# Patient Record
Sex: MALE | Race: WHITE | NOT HISPANIC OR LATINO | URBAN - METROPOLITAN AREA
[De-identification: names, ages, dates, MRNs, and addresses within clinical notes are randomized per-mention and may not be internally consistent; named-entity substitution may affect disease eponyms.]

---

## 2018-09-08 ENCOUNTER — EMERGENCY (EMERGENCY)
Facility: HOSPITAL | Age: 73
LOS: 1 days | Discharge: HOME | End: 2018-09-08
Attending: EMERGENCY MEDICINE | Admitting: EMERGENCY MEDICINE
Payer: MEDICARE

## 2018-09-08 VITALS
SYSTOLIC BLOOD PRESSURE: 144 MMHG | TEMPERATURE: 99 F | DIASTOLIC BLOOD PRESSURE: 81 MMHG | RESPIRATION RATE: 18 BRPM | OXYGEN SATURATION: 95 % | HEART RATE: 64 BPM

## 2018-09-08 VITALS
DIASTOLIC BLOOD PRESSURE: 77 MMHG | OXYGEN SATURATION: 96 % | RESPIRATION RATE: 16 BRPM | WEIGHT: 199.96 LBS | HEART RATE: 89 BPM | TEMPERATURE: 98 F | HEIGHT: 67 IN | SYSTOLIC BLOOD PRESSURE: 139 MMHG

## 2018-09-08 PROCEDURE — 99284 EMERGENCY DEPT VISIT MOD MDM: CPT

## 2018-09-08 PROCEDURE — 70450 CT HEAD/BRAIN W/O DYE: CPT

## 2018-09-08 PROCEDURE — 72125 CT NECK SPINE W/O DYE: CPT

## 2018-09-08 PROCEDURE — 70450 CT HEAD/BRAIN W/O DYE: CPT | Mod: 26

## 2018-09-08 PROCEDURE — 99284 EMERGENCY DEPT VISIT MOD MDM: CPT | Mod: 25

## 2018-09-08 PROCEDURE — 72125 CT NECK SPINE W/O DYE: CPT | Mod: 26

## 2018-09-08 RX ORDER — FAMOTIDINE 10 MG/ML
20 INJECTION INTRAVENOUS ONCE
Qty: 0 | Refills: 0 | Status: COMPLETED | OUTPATIENT
Start: 2018-09-08 | End: 2018-09-08

## 2018-09-08 RX ADMIN — FAMOTIDINE 20 MILLIGRAM(S): 10 INJECTION INTRAVENOUS at 13:50

## 2018-09-08 NOTE — ED PROVIDER NOTE - OBJECTIVE STATEMENT
73 M w HTN, HLD, presents w complaint of left foot weakness for the past 2 weeks which he believes caused 2 separate falls. One fall was in the street and another in the house. No significant trauma with the initial fall but the fall that occurred 2 days ago in the house he remembers striking his head on the night stand. He recalls having chronic left sided sciatica up until 4 years ago without recurrence. He never had an MRI for investigation and denies pain at this time. Denies headache, nausea, vomiting, dizziness.

## 2018-09-08 NOTE — ED ADULT NURSE NOTE - DISCHARGE TEACHING
follow up with PMD and with spinal specialist (number provided) for possible MRI, wear ankle foot orthotic device on L. foot, get pepcid 20mg over the counter, return for new or worsening s/s

## 2018-09-08 NOTE — ED ADULT NURSE NOTE - NSIMPLEMENTINTERV_GEN_ALL_ED
Implemented All Fall Risk Interventions:  [place] to call system. Call bell, personal items and telephone within reach. Instruct patient to call for assistance. Room bathroom lighting operational. Non-slip footwear when patient is off stretcher. Physically safe environment: no spills, clutter or unnecessary equipment. Stretcher in lowest position, wheels locked, appropriate side rails in place. Provide visual cue, wrist band, yellow gown, etc. Monitor gait and stability. Monitor for mental status changes and reorient to person, place, and time. Review medications for side effects contributing to fall risk. Reinforce activity limits and safety measures with patient and family.

## 2018-09-08 NOTE — ED PROVIDER NOTE - PROGRESS NOTE DETAILS
spoke to family and patient about results and foot drop. advised will need f/u with neuro and possible orthotic.  will f/u outpatient. will d/c home.

## 2018-09-08 NOTE — ED PROVIDER NOTE - CARE PLAN
Principal Discharge DX:	Foot drop, left  Assessment and plan of treatment:	Follow up with your Primary Care Physician within the next 2-3 days  Follow up with Spine Specialist [phone] within the next 3 days for possible MRI of Lumbosacral spine  Wear Ankle Foot Orthotic device to the left foot  Bring a copy of your test results with you to your appointment  Continue your current medication regimen  Return to the Emergency Room if you experience new or worsening symptoms

## 2018-09-08 NOTE — ED ADULT NURSE NOTE - CHIEF COMPLAINT QUOTE
multiple falls 'my feet is giving out" hit head x 2 on asa   increased sleeping after taking melatonin and xanax

## 2018-09-08 NOTE — ED ADULT TRIAGE NOTE - CHIEF COMPLAINT QUOTE
multiple falls 'my feet is giving out" denies head injury   increased sleeping after taking melatonin and xanax multiple falls 'my feet is giving out" hit head x 2 on asa   increased sleeping after taking melatonin and xanax

## 2018-09-08 NOTE — ED ADULT NURSE NOTE - OBJECTIVE STATEMENT
73y Male PMH htn hld on baby aspirin presents to the ED c/o falls. Pt states that he has hx of Sciatica but has not had any symptoms in 4-5 years and that when he did have symptoms it would be lower back pain with shooting pain down the L. leg. Pt currently reporting recent falls due to limited movement to the L. foot. Pt states he fell two days ago while trying to get out of bed and reports hitting his head on the night-stand, denies LOC. Pt also reports not being able to sleep well lately and has been taking Xanax and melatonin with minimal help. Pt presents a&oX4, ambulatory, well in appearance, airway intact, breathing spontaneously and unlabored, gross neuro intact, no slurred speech or facial droop noted, PERRL, equal sensation to bilateral extremities, decreased strength noted to L. foot, pt unable to dorsiflex L. foot, small cut noted to lateral side of L. foot- no active bleeding noted at this time (pt states it is from fall), pedal pulses presents and equal, cap refill <2 seconds, denies ha, dizziness, CP, SOB, pain, n/v, chills/fever, diaphoresis. MD at bedside for eval. safety maintained.

## 2018-09-08 NOTE — ED PROVIDER NOTE - PLAN OF CARE
Follow up with your Primary Care Physician within the next 2-3 days  Follow up with Spine Specialist [phone] within the next 3 days for possible MRI of Lumbosacral spine  Wear Ankle Foot Orthotic device to the left foot  Bring a copy of your test results with you to your appointment  Continue your current medication regimen  Return to the Emergency Room if you experience new or worsening symptoms

## 2018-09-10 PROBLEM — E78.5 HYPERLIPIDEMIA, UNSPECIFIED: Chronic | Status: ACTIVE | Noted: 2018-09-08

## 2018-09-10 PROBLEM — I10 ESSENTIAL (PRIMARY) HYPERTENSION: Chronic | Status: ACTIVE | Noted: 2018-09-08

## 2018-09-11 ENCOUNTER — APPOINTMENT (OUTPATIENT)
Dept: ORTHOPEDIC SURGERY | Facility: CLINIC | Age: 73
End: 2018-09-11
Payer: MEDICARE

## 2018-09-11 VITALS
WEIGHT: 200 LBS | HEIGHT: 67 IN | HEART RATE: 76 BPM | SYSTOLIC BLOOD PRESSURE: 113 MMHG | DIASTOLIC BLOOD PRESSURE: 70 MMHG | BODY MASS INDEX: 31.39 KG/M2

## 2018-09-11 DIAGNOSIS — Z86.39 PERSONAL HISTORY OF OTHER ENDOCRINE, NUTRITIONAL AND METABOLIC DISEASE: ICD-10-CM

## 2018-09-11 DIAGNOSIS — M51.26 OTHER INTERVERTEBRAL DISC DISPLACEMENT, LUMBAR REGION: ICD-10-CM

## 2018-09-11 DIAGNOSIS — R29.898 OTHER SYMPTOMS AND SIGNS INVOLVING THE MUSCULOSKELETAL SYSTEM: ICD-10-CM

## 2018-09-11 PROCEDURE — 99204 OFFICE O/P NEW MOD 45 MIN: CPT

## 2018-09-11 PROCEDURE — 72100 X-RAY EXAM L-S SPINE 2/3 VWS: CPT

## 2018-09-12 ENCOUNTER — INPATIENT (INPATIENT)
Facility: HOSPITAL | Age: 73
LOS: 5 days | Discharge: HOME | DRG: 64 | End: 2018-09-18
Attending: PSYCHIATRY & NEUROLOGY | Admitting: STUDENT IN AN ORGANIZED HEALTH CARE EDUCATION/TRAINING PROGRAM
Payer: MEDICARE

## 2018-09-12 VITALS
DIASTOLIC BLOOD PRESSURE: 73 MMHG | TEMPERATURE: 99 F | HEART RATE: 81 BPM | RESPIRATION RATE: 16 BRPM | OXYGEN SATURATION: 96 % | SYSTOLIC BLOOD PRESSURE: 168 MMHG | WEIGHT: 199.96 LBS

## 2018-09-12 DIAGNOSIS — R09.89 OTHER SPECIFIED SYMPTOMS AND SIGNS INVOLVING THE CIRCULATORY AND RESPIRATORY SYSTEMS: ICD-10-CM

## 2018-09-12 DIAGNOSIS — M21.372 FOOT DROP, LEFT FOOT: ICD-10-CM

## 2018-09-12 LAB
ALBUMIN SERPL ELPH-MCNC: 4.4 G/DL — SIGNIFICANT CHANGE UP (ref 3.3–5)
ALP SERPL-CCNC: 81 U/L — SIGNIFICANT CHANGE UP (ref 40–120)
ALT FLD-CCNC: 21 U/L — SIGNIFICANT CHANGE UP (ref 10–45)
ANION GAP SERPL CALC-SCNC: 12 MMOL/L — SIGNIFICANT CHANGE UP (ref 5–17)
APTT BLD: 27.3 SEC — LOW (ref 27.5–37.4)
AST SERPL-CCNC: 21 U/L — SIGNIFICANT CHANGE UP (ref 10–40)
BASOPHILS # BLD AUTO: 0 K/UL — SIGNIFICANT CHANGE UP (ref 0–0.2)
BASOPHILS NFR BLD AUTO: 0.3 % — SIGNIFICANT CHANGE UP (ref 0–2)
BILIRUB SERPL-MCNC: 0.5 MG/DL — SIGNIFICANT CHANGE UP (ref 0.2–1.2)
BUN SERPL-MCNC: 15 MG/DL — SIGNIFICANT CHANGE UP (ref 7–23)
CALCIUM SERPL-MCNC: 9.4 MG/DL — SIGNIFICANT CHANGE UP (ref 8.4–10.5)
CHLORIDE SERPL-SCNC: 100 MMOL/L — SIGNIFICANT CHANGE UP (ref 96–108)
CO2 SERPL-SCNC: 26 MMOL/L — SIGNIFICANT CHANGE UP (ref 22–31)
CREAT SERPL-MCNC: 1.26 MG/DL — SIGNIFICANT CHANGE UP (ref 0.5–1.3)
EOSINOPHIL # BLD AUTO: 0.1 K/UL — SIGNIFICANT CHANGE UP (ref 0–0.5)
EOSINOPHIL NFR BLD AUTO: 0.9 % — SIGNIFICANT CHANGE UP (ref 0–6)
GLUCOSE SERPL-MCNC: 143 MG/DL — HIGH (ref 70–99)
HCT VFR BLD CALC: 43.2 % — SIGNIFICANT CHANGE UP (ref 39–50)
HGB BLD-MCNC: 14.7 G/DL — SIGNIFICANT CHANGE UP (ref 13–17)
INR BLD: 0.95 RATIO — SIGNIFICANT CHANGE UP (ref 0.88–1.16)
LYMPHOCYTES # BLD AUTO: 1.4 K/UL — SIGNIFICANT CHANGE UP (ref 1–3.3)
LYMPHOCYTES # BLD AUTO: 24 % — SIGNIFICANT CHANGE UP (ref 13–44)
MCHC RBC-ENTMCNC: 31.9 PG — SIGNIFICANT CHANGE UP (ref 27–34)
MCHC RBC-ENTMCNC: 34.2 GM/DL — SIGNIFICANT CHANGE UP (ref 32–36)
MCV RBC AUTO: 93.5 FL — SIGNIFICANT CHANGE UP (ref 80–100)
MONOCYTES # BLD AUTO: 0.6 K/UL — SIGNIFICANT CHANGE UP (ref 0–0.9)
MONOCYTES NFR BLD AUTO: 9.4 % — SIGNIFICANT CHANGE UP (ref 2–14)
NEUTROPHILS # BLD AUTO: 3.9 K/UL — SIGNIFICANT CHANGE UP (ref 1.8–7.4)
NEUTROPHILS NFR BLD AUTO: 65.4 % — SIGNIFICANT CHANGE UP (ref 43–77)
PLATELET # BLD AUTO: 170 K/UL — SIGNIFICANT CHANGE UP (ref 150–400)
POTASSIUM SERPL-MCNC: 3.9 MMOL/L — SIGNIFICANT CHANGE UP (ref 3.5–5.3)
POTASSIUM SERPL-SCNC: 3.9 MMOL/L — SIGNIFICANT CHANGE UP (ref 3.5–5.3)
PROT SERPL-MCNC: 6.9 G/DL — SIGNIFICANT CHANGE UP (ref 6–8.3)
PROTHROM AB SERPL-ACNC: 10.3 SEC — SIGNIFICANT CHANGE UP (ref 9.8–12.7)
RBC # BLD: 4.62 M/UL — SIGNIFICANT CHANGE UP (ref 4.2–5.8)
RBC # FLD: 11.8 % — SIGNIFICANT CHANGE UP (ref 10.3–14.5)
SODIUM SERPL-SCNC: 138 MMOL/L — SIGNIFICANT CHANGE UP (ref 135–145)
WBC # BLD: 6 K/UL — SIGNIFICANT CHANGE UP (ref 3.8–10.5)
WBC # FLD AUTO: 6 K/UL — SIGNIFICANT CHANGE UP (ref 3.8–10.5)

## 2018-09-12 PROCEDURE — 99285 EMERGENCY DEPT VISIT HI MDM: CPT

## 2018-09-12 PROCEDURE — 99223 1ST HOSP IP/OBS HIGH 75: CPT

## 2018-09-12 RX ORDER — SODIUM CHLORIDE 9 MG/ML
3 INJECTION INTRAMUSCULAR; INTRAVENOUS; SUBCUTANEOUS ONCE
Qty: 0 | Refills: 0 | Status: COMPLETED | OUTPATIENT
Start: 2018-09-12 | End: 2018-09-12

## 2018-09-12 RX ORDER — SODIUM CHLORIDE 9 MG/ML
1000 INJECTION INTRAMUSCULAR; INTRAVENOUS; SUBCUTANEOUS
Qty: 0 | Refills: 0 | Status: DISCONTINUED | OUTPATIENT
Start: 2018-09-12 | End: 2018-09-14

## 2018-09-12 RX ADMIN — SODIUM CHLORIDE 3 MILLILITER(S): 9 INJECTION INTRAMUSCULAR; INTRAVENOUS; SUBCUTANEOUS at 16:18

## 2018-09-12 RX ADMIN — SODIUM CHLORIDE 125 MILLILITER(S): 9 INJECTION INTRAMUSCULAR; INTRAVENOUS; SUBCUTANEOUS at 17:51

## 2018-09-12 RX ADMIN — SODIUM CHLORIDE 125 MILLILITER(S): 9 INJECTION INTRAMUSCULAR; INTRAVENOUS; SUBCUTANEOUS at 16:18

## 2018-09-12 RX ADMIN — SODIUM CHLORIDE 1000 MILLILITER(S): 9 INJECTION INTRAMUSCULAR; INTRAVENOUS; SUBCUTANEOUS at 16:25

## 2018-09-12 NOTE — CONSULT NOTE ADULT - ASSESSMENT
Pt is a 72 yo M with a PMH of CAD, GERD, HLD, HTN and Left Sciatic (5 years ago) who is presenting with left foot drop for the past 2 weeks w/o progression or improvement, sent in by his orthopod for an MRI L-spine. On exam pt is with 4-/5 left dorsiflexion and 3/5 left inversion/eversion strength (otherwise 5/5) with a patchy left foot dorsum decreased sensation and hyperreflexia in L5. Straight leg test on the left elicited some shooting pain and increased numbness over the dorsum of the foot. Per exam and presentation pt has a left L5 radiculopathy, likely due to a herniated disc.    Recommendations:  - Conservative management  - EMG as outpatient, can call [phone] for [facility] appointment  - Rest of management as per ED and Spine surgery team

## 2018-09-12 NOTE — ED ADULT NURSE NOTE - OBJECTIVE STATEMENT
report received from Patti RN with no assessment and RN note, pt with recent falls seen in ED on sat with complaint of falls and LLE weakness and DCd home, pt sent by MD for assessment of foot drop

## 2018-09-12 NOTE — CONSULT NOTE ADULT - SUBJECTIVE AND OBJECTIVE BOX
HPI:  Pt is a 72 yo M with a PMH of CAD, GERD, HLD, HTN and Left Sciatic (5 years ago) who is presenting with left foot drop for the past 2 weeks, sent in by his orthopod for an MRI L-spine. Pt notes symptoms started 2 weeks ago on awakening, have not worsened or improved, cannot  his foot, this has never happened before and he notes no other symptoms or pains. Pt denies any recent fevers, chills, HA, dizziness, numbness, tingling, other weakness, vision changes, urinary or bowel changes, CP, SOB, cough, nausea, vomiting.      MEDICATIONS  (STANDING):  sodium chloride 0.9%. 1000 milliLiter(s) (125 mL/Hr) IV Continuous <Continuous>    MEDICATIONS  (PRN):    PAST MEDICAL & SURGICAL HISTORY:  GERD (gastroesophageal reflux disease)  CAD (coronary artery disease)  HLD (hyperlipidemia)  HTN (hypertension)    FAMILY HISTORY:    Allergies    No Known Allergies    Intolerances    SHx - No smoking, No ETOH, No drug abuse    Review of Systems:  CONSTITUTIONAL:   See HPI.    Vital Signs Last 24 Hrs  T(C): 37.1 (12 Sep 2018 19:18), Max: 37.1 (12 Sep 2018 19:18)  T(F): 98.7 (12 Sep 2018 19:18), Max: 98.7 (12 Sep 2018 19:18)  HR: 79 (12 Sep 2018 19:18) (79 - 81)  BP: 160/70 (12 Sep 2018 19:18) (160/70 - 168/73)  BP(mean): --  RR: 17 (12 Sep 2018 19:18) (16 - 17)  SpO2: 97% (12 Sep 2018 19:18) (96% - 97%)      General Exam:   General appearance: No acute distress    Neurological Exam:  Mental Status: Orientated to self, date and place.  Attention intact.  No dysarthria. Speech fluent.  Cranial Nerves:   PERRL, EOMI, VFF, no nystagmus.    CN V1-3 intact to light touch .  No facial asymmetry.  Hearing intact to finger rub bilaterally.  Tongue, uvula and palate midline.  Sternocleidomastoid and Trapezius intact bilaterally.    Motor:   Tone: normal.                  Strength: L-Dorsiflexion 4-/5, L-Eversion 3/5 and L-Inversion 3/5, otherwise all other muscle groups 5/5  Straight leg test on the left elicited some shooting pain and increased numbness over the dorsum of the foot.  Pronator drift: none b/l  Dysmetria: None to finger-nose-finger b/l  No truncal ataxia.    Tremor: No resting, postural or action tremor.  No myoclonus.    Sensation: decreased in a patchy area over the dorsum of the left foot.    Deep Tendon Reflexes:              Biceps          BR        Patellar        L5      Ankle         Babinski                                         R       2+               2+        2+              1+      1+             downgoing                                         L        2+               2+        2+              3+      1+             downgoing      09-12    138  |  100  |  15  ----------------------------<  143<H>  3.9   |  26  |  1.26    Ca    9.4      12 Sep 2018 16:21    TPro  6.9  /  Alb  4.4  /  TBili  0.5  /  DBili  x   /  AST  21  /  ALT  21  /  AlkPhos  81  09-12               14.7   6.0   )-----------( 170      ( 12 Sep 2018 16:21 )             43.2

## 2018-09-12 NOTE — H&P ADULT - NSHPSOCIALHISTORY_GEN_ALL_CORE
Social History:    Marital Status:  ( x  )    (   ) Single    (   )    (  )   Occupation: retired    Lives with: (  ) alone  (  ) children   ( x ) spouse   (  ) parents  (  ) other    Substance Use (street drugs): (x  ) never used  (  ) other:  Tobacco Usage:  (  x ) never smoked   (   ) former smoker   (   ) current smoker  (     ) pack year  (        ) last cigarette date  Alcohol Usage: no etoh use

## 2018-09-12 NOTE — H&P ADULT - HISTORY OF PRESENT ILLNESS
Patient is a 73 year old male w/PMH of CAD, GERD, HLD, HTN and Left Sciatic (5 years ago) who is presenting with left foot drop for the past 2 weeks,  Patient reports worsening left leg weakness over two weeks , complicated by recurrent falls including minor head trauma,  symptoms have been persistent during this time, not worsening . Patient reports no pain. No fever or chills , no other neurological deficits.  No urinary or bowel incontinence. He was seen in the emergency room several days prior to admission for fall , CT head imaging at that time showed no acute findings , patient was provided a leg brace and scheduled to follow up with orthopedics.  On day of admission, patient was evaluated by orthopedics who recommended coming to the hospital for further work up. Patient is a 73 year old male w/PMH of CAD, GERD, HLD, HTN and Left Sciatic (5 years ago) who is presenting with left foot drop for the past 2 weeks,  Patient reports worsening left leg weakness over two weeks , symptoms are localized to his  foot and toes,  during this time he reports recurrent falls including minor head trauma,  symptoms have been persistent not worsening . Patient reports no pain. No fever or chills , no other neurological deficits.  No urinary or bowel incontinence. He was seen in the emergency room several days prior to admission for fall , CT head imaging at that time showed no acute findings , patient was provided a leg brace and scheduled to follow up with orthopedics.  On day of admission, patient was evaluated by orthopedics who recommended coming to the hospital for further work up.

## 2018-09-12 NOTE — ED ADULT NURSE NOTE - NSIMPLEMENTINTERV_GEN_ALL_ED
Implemented All Fall Risk Interventions:  [place] to call system. Call bell, personal items and telephone within reach. Instruct patient to call for assistance. Room bathroom lighting operational. Non-slip footwear when patient is off stretcher. Physically safe environment: no spills, clutter or unnecessary equipment. Stretcher in lowest position, wheels locked, appropriate side rails in place. Provide visual cue, wrist band, yellow gown, etc. Monitor gait and stability. Monitor for mental status changes and reorient to person, place, and time. Review medications for side effects contributing to fall risk. Reinforce activity limits and safety measures with patient and family.

## 2018-09-12 NOTE — H&P ADULT - NSHPLABSRESULTS_GEN_ALL_CORE
Labs personally reviewed:                          14.7   6.0   )-----------( 170      ( 12 Sep 2018 16:21 )             43.2     09-12    138  |  100  |  15  ----------------------------<  143<H>  3.9   |  26  |  1.26    Ca    9.4      12 Sep 2018 16:21    TPro  6.9  /  Alb  4.4  /  TBili  0.5  /  DBili  x   /  AST  21  /  ALT  21  /  AlkPhos  81  09-12        LIVER FUNCTIONS - ( 12 Sep 2018 16:21 )  Alb: 4.4 g/dL / Pro: 6.9 g/dL / ALK PHOS: 81 U/L / ALT: 21 U/L / AST: 21 U/L / GGT: x           PT/INR - ( 12 Sep 2018 16:21 )   PT: 10.3 sec;   INR: 0.95 ratio         PTT - ( 12 Sep 2018 16:21 )  PTT:27.3 sec    CAPILLARY BLOOD GLUCOSE          Imaging:    EKG personally reviewed: Labs personally reviewed:                          14.7   6.0   )-----------( 170      ( 12 Sep 2018 16:21 )             43.2     09-12    138  |  100  |  15  ----------------------------<  143<H>  3.9   |  26  |  1.26    Ca    9.4      12 Sep 2018 16:21    TPro  6.9  /  Alb  4.4  /  TBili  0.5  /  DBili  x   /  AST  21  /  ALT  21  /  AlkPhos  81  09-12        LIVER FUNCTIONS - ( 12 Sep 2018 16:21 )  Alb: 4.4 g/dL / Pro: 6.9 g/dL / ALK PHOS: 81 U/L / ALT: 21 U/L / AST: 21 U/L / GGT: x           PT/INR - ( 12 Sep 2018 16:21 )   PT: 10.3 sec;   INR: 0.95 ratio         PTT - ( 12 Sep 2018 16:21 )  PTT:27.3 sec    CAPILLARY BLOOD GLUCOSE          Imaging:    EKG personally reviewed: NSR no acute s-t changes     CT brain: No acute intracranial hemorrhage or mass effect. No displaced calvarial   fracture.  CT cervical spine: No acute fracture or traumatic dilatation. No prevertebral soft tissue   swelling. Degenerative changes.  MRI-L spine pending

## 2018-09-12 NOTE — ED PROVIDER NOTE - PHYSICAL EXAMINATION
Gen:  alert, awake, no acute distress  HEENT:  atraumatic head, airway clear, pupils equal and round  CV:  rrr, nl S1, S2, no m/r/g  Pulm:  lungs CTA b/l  Abd: s/nt/nd, +BS  Ext:  moving all extremities, mild LLE weakness on dorsiflexion  Neuro:  grossly intact, no focal deficits  Skin:  clear, dry, intact  Psych: AOx3, normal affect, no apparent risk to self or others

## 2018-09-12 NOTE — ED PROVIDER NOTE - OBJECTIVE STATEMENT
pt seen here on Saturday for h/o LLE weakness and 2 falls, one with head trauma, pt had negative head ct and cervical spine CT for evaluation of the leg weakness, saw Dr. Lopez (ortho spine) as an outpatient today and was sent to ER for admission and work up of foot drop.  pt states that he has been unable to lift his L foot fully and has difficulty walking.

## 2018-09-12 NOTE — H&P ADULT - PROBLEM SELECTOR PLAN 1
symptoms persistent , no acute findings on CTH obtained several days pta , difficulty  ambulating , recurrent falls , will obtain MRI as per ortho recommendations  - MRI spine - ortho spine consult pending results   - Neurology consult appreciated   - PT

## 2018-09-12 NOTE — H&P ADULT - NSHPPHYSICALEXAM_GEN_ALL_CORE
Vital Signs Last 24 Hrs  T(C): 37.1 (12 Sep 2018 19:18), Max: 37.1 (12 Sep 2018 19:18)  T(F): 98.7 (12 Sep 2018 19:18), Max: 98.7 (12 Sep 2018 19:18)  HR: 74 (12 Sep 2018 22:43) (74 - 81)  BP: 154/68 (12 Sep 2018 22:43) (154/68 - 168/73)  BP(mean): --  RR: 17 (12 Sep 2018 22:43) (16 - 17)  SpO2: 99% (12 Sep 2018 22:43) (96% - 99%) Vital Signs Last 24 Hrs  T(C): 37.1 (12 Sep 2018 19:18), Max: 37.1 (12 Sep 2018 19:18)  T(F): 98.7 (12 Sep 2018 19:18), Max: 98.7 (12 Sep 2018 19:18)  HR: 74 (12 Sep 2018 22:43) (74 - 81)  BP: 154/68 (12 Sep 2018 22:43) (154/68 - 168/73)  BP(mean): --  RR: 17 (12 Sep 2018 22:43) (16 - 17)  SpO2: 99% (12 Sep 2018 22:43) (96% - 99%)    GENERAL: No acute distress, well-developed  HEAD:  Atraumatic, Normocephalic  ENT: EOMI, PERRLA, conjunctiva and sclera clear,  moist mucosa no pharyngeal erythema or exudates   NECK: supple , no JVD   CHEST/LUNG: Clear to auscultation bilaterally; No wheeze, equal breath sounds bilaterally   BACK: No spinal tenderness,  No CVA tenderness   HEART: Regular rate and rhythm; No murmurs, rubs, or gallops  ABDOMEN: Soft, Nontender, Nondistended; Bowel sounds present  EXTREMITIES:  No clubbing, cyanosis, or edema  MSK: No joint swelling or effusions, ROM intact   PSYCH: Normal behavior/affect  NEUROLOGY: AAOx3, cranial nerves intact, decreased motor strength on LLE 3/5  distally 4/5 proximal muscle groups ,  5/5 motor throughout ,  finger to nose intact negative babinski , sensorium grossly intact   SKIN: Normal color, No rashes or lesions

## 2018-09-12 NOTE — H&P ADULT - NSHPREVIEWOFSYSTEMS_GEN_ALL_CORE
CONSTITUTIONAL: No weakness, fevers or chills  EYES/ENT: No visual changes;  No dysphagia  NECK: No pain or stiffness  RESPIRATORY: No cough, wheezing, hemoptysis; No shortness of breath  CARDIOVASCULAR: No chest pain or palpitations; No lower extremity edema  EXTREMITIES: no le edema, cyanosis, clubbing  GASTROINTESTINAL: No abdominal or epigastric pain. No nausea, vomiting, or hematemesis; No diarrhea or constipation. No melena or hematochezia.  BACK: No back pain  GENITOURINARY: No dysuria, frequency or hematuria  NEUROLOGICAL: No numbness + weakness  MSK: no joint swelling or pain  SKIN: No itching, burning, rashes, or lesions   PSYCH: no agitation  All other review of systems is negative unless indicated above.

## 2018-09-12 NOTE — CONSULT NOTE ADULT - ATTENDING COMMENTS
72 year old [place] M with two week history of acute left foot drop with sensory dysfunction in the dorsum of the left foot with prior history of severe low back pain radiating to the left leg (described as sciatica) which improved uneventfully after epidural injection. Current symptoms are that of left foot drop with some back pain. History of HLD and HTN. Neurological examination reveals normal mental functions, cranial nerves and upper extremity evaluation except for mild LUE hyperreflexia. In the lower extremity, knee reflexes are 2+, ankles 1+ and there is a left Babinski sign, there is mild decrease in pin perception in the left dorsum of the foot, other sensory modalities are intact, straight leg testing is minimally positive on the left.     Diagnostic considerations include small lacunar stroke in the right subcortical area and possibility of left L5 nerve root compression.     Suggest MRI brain w/ DWI and MRI Lumbar spine. Please call for follow up if necessary.

## 2018-09-13 DIAGNOSIS — G47.00 INSOMNIA, UNSPECIFIED: ICD-10-CM

## 2018-09-13 DIAGNOSIS — I10 ESSENTIAL (PRIMARY) HYPERTENSION: ICD-10-CM

## 2018-09-13 DIAGNOSIS — M21.372 FOOT DROP, LEFT FOOT: ICD-10-CM

## 2018-09-13 DIAGNOSIS — I25.10 ATHEROSCLEROTIC HEART DISEASE OF NATIVE CORONARY ARTERY WITHOUT ANGINA PECTORIS: ICD-10-CM

## 2018-09-13 DIAGNOSIS — K21.9 GASTRO-ESOPHAGEAL REFLUX DISEASE WITHOUT ESOPHAGITIS: ICD-10-CM

## 2018-09-13 LAB
ANION GAP SERPL CALC-SCNC: 11 MMOL/L — SIGNIFICANT CHANGE UP (ref 5–17)
BLD GP AB SCN SERPL QL: NEGATIVE — SIGNIFICANT CHANGE UP
BUN SERPL-MCNC: 12 MG/DL — SIGNIFICANT CHANGE UP (ref 7–23)
CALCIUM SERPL-MCNC: 8.9 MG/DL — SIGNIFICANT CHANGE UP (ref 8.4–10.5)
CHLORIDE SERPL-SCNC: 105 MMOL/L — SIGNIFICANT CHANGE UP (ref 96–108)
CO2 SERPL-SCNC: 28 MMOL/L — SIGNIFICANT CHANGE UP (ref 22–31)
CREAT SERPL-MCNC: 1.11 MG/DL — SIGNIFICANT CHANGE UP (ref 0.5–1.3)
GLUCOSE SERPL-MCNC: 156 MG/DL — HIGH (ref 70–99)
HCT VFR BLD CALC: 38.6 % — LOW (ref 39–50)
HGB BLD-MCNC: 13.6 G/DL — SIGNIFICANT CHANGE UP (ref 13–17)
MCHC RBC-ENTMCNC: 33 PG — SIGNIFICANT CHANGE UP (ref 27–34)
MCHC RBC-ENTMCNC: 35.3 GM/DL — SIGNIFICANT CHANGE UP (ref 32–36)
MCV RBC AUTO: 93.5 FL — SIGNIFICANT CHANGE UP (ref 80–100)
PLATELET # BLD AUTO: 150 K/UL — SIGNIFICANT CHANGE UP (ref 150–400)
POTASSIUM SERPL-MCNC: 3.8 MMOL/L — SIGNIFICANT CHANGE UP (ref 3.5–5.3)
POTASSIUM SERPL-SCNC: 3.8 MMOL/L — SIGNIFICANT CHANGE UP (ref 3.5–5.3)
RBC # BLD: 4.13 M/UL — LOW (ref 4.2–5.8)
RBC # FLD: 11.4 % — SIGNIFICANT CHANGE UP (ref 10.3–14.5)
RH IG SCN BLD-IMP: POSITIVE — SIGNIFICANT CHANGE UP
SODIUM SERPL-SCNC: 144 MMOL/L — SIGNIFICANT CHANGE UP (ref 135–145)
WBC # BLD: 5.5 K/UL — SIGNIFICANT CHANGE UP (ref 3.8–10.5)
WBC # FLD AUTO: 5.5 K/UL — SIGNIFICANT CHANGE UP (ref 3.8–10.5)

## 2018-09-13 PROCEDURE — 71045 X-RAY EXAM CHEST 1 VIEW: CPT | Mod: 26

## 2018-09-13 PROCEDURE — 70551 MRI BRAIN STEM W/O DYE: CPT | Mod: 26

## 2018-09-13 PROCEDURE — 72148 MRI LUMBAR SPINE W/O DYE: CPT | Mod: 26

## 2018-09-13 PROCEDURE — 70498 CT ANGIOGRAPHY NECK: CPT | Mod: 26

## 2018-09-13 PROCEDURE — 70496 CT ANGIOGRAPHY HEAD: CPT | Mod: 26

## 2018-09-13 PROCEDURE — 99233 SBSQ HOSP IP/OBS HIGH 50: CPT

## 2018-09-13 RX ORDER — ENOXAPARIN SODIUM 100 MG/ML
40 INJECTION SUBCUTANEOUS EVERY 24 HOURS
Qty: 0 | Refills: 0 | Status: DISCONTINUED | OUTPATIENT
Start: 2018-09-13 | End: 2018-09-18

## 2018-09-13 RX ORDER — PANTOPRAZOLE SODIUM 20 MG/1
40 TABLET, DELAYED RELEASE ORAL
Qty: 0 | Refills: 0 | Status: DISCONTINUED | OUTPATIENT
Start: 2018-09-13 | End: 2018-09-18

## 2018-09-13 RX ORDER — ATORVASTATIN CALCIUM 80 MG/1
40 TABLET, FILM COATED ORAL AT BEDTIME
Qty: 0 | Refills: 0 | Status: DISCONTINUED | OUTPATIENT
Start: 2018-09-13 | End: 2018-09-18

## 2018-09-13 RX ORDER — LISINOPRIL 2.5 MG/1
40 TABLET ORAL DAILY
Qty: 0 | Refills: 0 | Status: DISCONTINUED | OUTPATIENT
Start: 2018-09-13 | End: 2018-09-18

## 2018-09-13 RX ORDER — METOPROLOL TARTRATE 50 MG
50 TABLET ORAL
Qty: 0 | Refills: 0 | Status: DISCONTINUED | OUTPATIENT
Start: 2018-09-13 | End: 2018-09-16

## 2018-09-13 RX ORDER — ASPIRIN/CALCIUM CARB/MAGNESIUM 324 MG
81 TABLET ORAL DAILY
Qty: 0 | Refills: 0 | Status: DISCONTINUED | OUTPATIENT
Start: 2018-09-13 | End: 2018-09-18

## 2018-09-13 RX ORDER — DIPHENHYDRAMINE HCL 50 MG
50 CAPSULE ORAL AT BEDTIME
Qty: 0 | Refills: 0 | Status: DISCONTINUED | OUTPATIENT
Start: 2018-09-13 | End: 2018-09-18

## 2018-09-13 RX ORDER — HYDROCHLOROTHIAZIDE 25 MG
12.5 TABLET ORAL DAILY
Qty: 0 | Refills: 0 | Status: DISCONTINUED | OUTPATIENT
Start: 2018-09-13 | End: 2018-09-18

## 2018-09-13 RX ORDER — CLOPIDOGREL BISULFATE 75 MG/1
75 TABLET, FILM COATED ORAL DAILY
Qty: 0 | Refills: 0 | Status: DISCONTINUED | OUTPATIENT
Start: 2018-09-13 | End: 2018-09-18

## 2018-09-13 RX ADMIN — PANTOPRAZOLE SODIUM 40 MILLIGRAM(S): 20 TABLET, DELAYED RELEASE ORAL at 07:07

## 2018-09-13 RX ADMIN — SODIUM CHLORIDE 125 MILLILITER(S): 9 INJECTION INTRAMUSCULAR; INTRAVENOUS; SUBCUTANEOUS at 22:53

## 2018-09-13 RX ADMIN — LISINOPRIL 40 MILLIGRAM(S): 2.5 TABLET ORAL at 10:32

## 2018-09-13 RX ADMIN — Medication 1 MILLIGRAM(S): at 09:06

## 2018-09-13 RX ADMIN — Medication 81 MILLIGRAM(S): at 10:42

## 2018-09-13 RX ADMIN — Medication 12.5 MILLIGRAM(S): at 10:33

## 2018-09-13 RX ADMIN — SODIUM CHLORIDE 125 MILLILITER(S): 9 INJECTION INTRAMUSCULAR; INTRAVENOUS; SUBCUTANEOUS at 00:56

## 2018-09-13 RX ADMIN — Medication 50 MILLIGRAM(S): at 10:33

## 2018-09-13 RX ADMIN — ENOXAPARIN SODIUM 40 MILLIGRAM(S): 100 INJECTION SUBCUTANEOUS at 22:52

## 2018-09-13 RX ADMIN — Medication 50 MILLIGRAM(S): at 22:52

## 2018-09-13 RX ADMIN — CLOPIDOGREL BISULFATE 75 MILLIGRAM(S): 75 TABLET, FILM COATED ORAL at 17:39

## 2018-09-13 RX ADMIN — ATORVASTATIN CALCIUM 40 MILLIGRAM(S): 80 TABLET, FILM COATED ORAL at 22:52

## 2018-09-13 RX ADMIN — Medication 50 MILLIGRAM(S): at 01:00

## 2018-09-13 NOTE — PHYSICAL THERAPY INITIAL EVALUATION ADULT - DISCHARGE DISPOSITION, PT EVAL
DC home with outpt PT services, assist from spouse as needed, pt may benefit from AFO bracing L foot, [phone] to be notified.

## 2018-09-13 NOTE — PHYSICAL THERAPY INITIAL EVALUATION ADULT - PERTINENT HX OF CURRENT PROBLEM, REHAB EVAL
Pt is a 73 year old male admitted to [facility] on 9/12/18 for L foot drop for past 2wks. Pt with worsening LLE weakness, localized to foot and toes, during this time he reports recurrent falls including minor head trauma. No urinary/bowel incontinence. He was seen in the emergency room several days prior to admission for fall

## 2018-09-13 NOTE — OCCUPATIONAL THERAPY INITIAL EVALUATION ADULT - PERTINENT HX OF CURRENT PROBLEM, REHAB EVAL
72 yo M p/w L foot drop for the past 2 weeks, Pt reports worsening L leg weakness over two weeks , symptoms are localized to his foot and toes,  during this time he reports recurrent falls including minor head trauma,  symptoms have been persistent not worsening. Pt reports no pain.  He was seen in the emergency room several days prior to admission for fall See below

## 2018-09-13 NOTE — CONSULT NOTE ADULT - ASSESSMENT
a/p 72 yo m w/ LLE weakness x 2 weeks  Patient denies trauma  MRI L Spine  Consider Steroids  PT/OT  will follow  pt has history of sciatica  r/o hernaited disk  will discusss w/ Dr. Lopez

## 2018-09-13 NOTE — CHART NOTE - NSCHARTNOTEFT_GEN_A_CORE
MRI findings reviewed, brain imaging remarkable for acute right cortical ischemic stroke which correlates with his findings of acute left foot drop, also has a chronic  right MCA stroke which patient was unaware of.     Impression: right JOSE LUIS territory ischemic stroke, etiology likely embolic from unknown source - cardiac?     Recommendations:   - c/w ASA 81 mg qd, c/w Atorvastatin   - will need vessel imaging such as CTA head/neck  - MRI findings reviewed, brain imaging remarkable for acute right cortical ischemic stroke which correlates with his findings of acute left foot drop, also has a chronic  right MCA stroke which patient was unaware of.     Impression: right [place] territory ischemic stroke, etiology likely embolic from unknown source - cardiac?     Recommendations:   - c/w ASA 81 mg qd, c/w Atorvastatin   - will need vessel imaging such as CTA head/neck  - HgA1c, lipid panel   - may benefit from cardioembolic w/u   - neuro checks q4h   - d/w stroke team who will evaluate patient for further care

## 2018-09-13 NOTE — OCCUPATIONAL THERAPY INITIAL EVALUATION ADULT - LIVES WITH, PROFILE
Pt lives in 2 family home with wife, 18 steps to enter, tub in bathroom. Pt I in ADLs and ambulation prior to admission/spouse

## 2018-09-13 NOTE — OCCUPATIONAL THERAPY INITIAL EVALUATION ADULT - ADDITIONAL COMMENTS
CT head imaging at that time showed no acute findings , pt was provided a leg brace and scheduled to follow up with orthopedics.  On day of admission, patient was evaluated by orthopedics who recommended coming to the hospital for further work up  CT Head: Mild degenerative disc disease at all lumbar levels. Moderate annular bulge L3-4 with ligamentous and facet hypertrophic changes and moderate spinal stenosis.  Small left-sided disc herniation L4-5 with left lateral recess stenosis due to the disc herniations and ligamentous hypertrophy as well as moderate spinal stenosis.  MRI Head:  Tiny area of acute infarction involving the right posterior precentral gyrus with associated cytotoxic edema. No hemorrhagic transformation. Additional small chronic high right-sided MCA distribution infarcts, as discussed. Multiple nonspecific abnormal white matter foci of T2/FLAIR prolongation  statistically favoring microvascular disease.

## 2018-09-13 NOTE — PROGRESS NOTE ADULT - ASSESSMENT
Pt is a 74 yo M with a PMH of CAD, GERD, HLD, HTN and Left Sciatic (5 years ago) who is presenting with left foot drop for the past 2 weeks w/o progression or improvement, sent in by his orthopod for an MRI L-spine. MRI brain is positive for acute infarction involving the right posterior precentral gyrus.     Impression     Acute infarction causing foot weakness in left foot     Plan     no need for permissive HTN  CTA head and neck   HgA1c  Lipid profile  ASA 81mg and Plavix 75 mg   Atorvastatin 80 mg   TTE  Telemetry monitoring  LOOP

## 2018-09-13 NOTE — CONSULT NOTE ADULT - SUBJECTIVE AND OBJECTIVE BOX
Pt is a 74 yo M complains of LLE weakness over the past 2-3 weeks, Seen by Dr. Lopez as outpatient and told he should have neurologic workup and MRI to R/O HNP as cause of his symptoms vs. Something neurologic in pathology.    PMH of CAD, GERD, HLD, HTN and Left Sciatic (5 years ago) who is presenting with left foot drop for the past 2 weeks, sent in by his orthopod for an MRI L-spine. Pt notes symptoms started 2 weeks ago on awakening, have not worsened or improved, cannot  his foot, this has never happened before and he notes no other symptoms or pains. Pt denies any recent fevers, chills, HA, dizziness, numbness, tingling, other weakness, vision changes, urinary or bowel changes, CP, SOB, cough, nausea, vomiting.    PAST MEDICAL & SURGICAL HISTORY:  GERD (gastroesophageal reflux disease)  CAD (coronary artery disease)  HLD (hyperlipidemia)  HTN (hypertension)  No significant past surgical history      FAMILY HISTORY:  No pertinent family history in first degree relatives      SOCIAL HISTORY:     Vital Signs Last 24 Hrs  T(C): 36.6 (13 Sep 2018 03:43), Max: 37.1 (12 Sep 2018 19:18)  T(F): 97.9 (13 Sep 2018 03:43), Max: 98.7 (12 Sep 2018 19:18)  HR: 66 (13 Sep 2018 03:43) (66 - 81)  BP: 143/75 (13 Sep 2018 03:43) (143/75 - 168/73)  BP(mean): --  RR: 16 (13 Sep 2018 03:43) (16 - 17)  SpO2: 99% (13 Sep 2018 03:43) (96% - 99%)  I&O's Detail      LABS:                        14.7   6.0   )-----------( 170      ( 12 Sep 2018 16:21 )             43.2     09-12    138  |  100  |  15  ----------------------------<  143<H>  3.9   |  26  |  1.26    Ca    9.4      12 Sep 2018 16:21    TPro  6.9  /  Alb  4.4  /  TBili  0.5  /  DBili  x   /  AST  21  /  ALT  21  /  AlkPhos  81  09-12    PT/INR - ( 12 Sep 2018 16:21 )   PT: 10.3 sec;   INR: 0.95 ratio         PTT - ( 12 Sep 2018 16:21 )  PTT:27.3 sec      RADIOLOGY & ADDITIONAL STUDIES:    PHYSICAL EXAM:  General; Awake and alert, Oriented x 3  Hips/Pelvis:   ABle to SLR bilaterally. Negative log roll, heel strike. No pain on passive Int/Ext hip rotation.  Spine exam:  Neck: supple, NT, Full Painless baseline AROM  Back:   Spine:                       Motor exam:          Right Upper extremity: Delt 5/5, Biceps 5/5, Triceps 5/5, Wrist Ext 5/5, Wrist Flexion 5/5,  Strength 5/5         SILT C5-S1, No Clonus, Negative Kirk, +RP, Compartments soft           Left Upper extremity: Delt 5/5, Biceps 5/5, Triceps 5/5, Wrist Ext 5/5, Wrist Flexion 5/5,  Strength 5/5         SILT C5-S1, No Clonus, Negative Kirk, +RP, Compartments soft           Right Lower Extremity: Hip Flexion 5/5, Hip Extension 5/5, Knee Flexion 5/5, Knee Extension 5/5, Ankle Dorsiflexion 5/5,          Ankle Plantar Flexion 5/5, Extensor hallucis Longus 5/5         SILT L2-S1, No pain with SLR, Negative Clonus, Negative Babinski                  Left Lower Extremity: Hip Flexion 5/5, Hip Extension 5/5, Knee Flexion 5/5, Knee Extension 5/5, Ankle Dorsiflexion 5/5,          Ankle Plantar Flexion 5/5, Extensor hallucis Longus 5/5         SILT L2-S1, No pain with SLR, Negative Clonus, Negative Babinski                                            A/P :  - Pt is a 74 yo M complains of LLE weakness over the past 2-3 weeks, Seen by Dr. Lopez as outpatient and told he should have neurologic workup and MRI to R/O HNP as cause of his symptoms vs. Something neurologic in pathology.    PMH of CAD, GERD, HLD, HTN and Left Sciatic (5 years ago) who is presenting with left foot drop for the past 2 weeks, sent in by his orthopod for an MRI L-spine. Pt notes symptoms started 2 weeks ago on awakening, have not worsened or improved, cannot  his foot, this has never happened before and he notes no other symptoms or pains. Pt denies any recent fevers, chills, HA, dizziness, numbness, tingling, other weakness, vision changes, urinary or bowel changes, CP, SOB, cough, nausea, vomiting.  patient has history of sciatica    PAST MEDICAL & SURGICAL HISTORY:  GERD (gastroesophageal reflux disease)  CAD (coronary artery disease)  HLD (hyperlipidemia)  HTN (hypertension)  No significant past surgical history      FAMILY HISTORY:  No pertinent family history in first degree relatives      SOCIAL HISTORY:     Vital Signs Last 24 Hrs  T(C): 36.6 (13 Sep 2018 03:43), Max: 37.1 (12 Sep 2018 19:18)  T(F): 97.9 (13 Sep 2018 03:43), Max: 98.7 (12 Sep 2018 19:18)  HR: 66 (13 Sep 2018 03:43) (66 - 81)  BP: 143/75 (13 Sep 2018 03:43) (143/75 - 168/73)  BP(mean): --  RR: 16 (13 Sep 2018 03:43) (16 - 17)  SpO2: 99% (13 Sep 2018 03:43) (96% - 99%)  I&O's Detail      LABS:                        14.7   6.0   )-----------( 170      ( 12 Sep 2018 16:21 )             43.2     09-12    138  |  100  |  15  ----------------------------<  143<H>  3.9   |  26  |  1.26    Ca    9.4      12 Sep 2018 16:21    TPro  6.9  /  Alb  4.4  /  TBili  0.5  /  DBili  x   /  AST  21  /  ALT  21  /  AlkPhos  81  09-12    PT/INR - ( 12 Sep 2018 16:21 )   PT: 10.3 sec;   INR: 0.95 ratio         PTT - ( 12 Sep 2018 16:21 )  PTT:27.3 sec      RADIOLOGY & ADDITIONAL STUDIES:    PHYSICAL EXAM:  General; Awake and alert, Oriented x 3  Hips/Pelvis:   ABle to SLR bilaterally. Negative log roll, heel strike. No pain on passive Int/Ext hip rotation.  Spine exam:  Neck: supple, NT, Full Painless baseline AROM  Back:   Spine:                       Motor exam:          Right Upper extremity: Delt 5/5, Biceps 5/5, Triceps 5/5, Wrist Ext 5/5, Wrist Flexion 5/5,  Strength 5/5         SILT C5-S1, No Clonus, Negative Kirk, +RP, Compartments soft           Left Upper extremity: Delt 5/5, Biceps 5/5, Triceps 5/5, Wrist Ext 5/5, Wrist Flexion 5/5,  Strength 5/5         SILT C5-S1, No Clonus, Negative Kirk, +RP, Compartments soft           Right Lower Extremity: Hip Flexion 5/5, Hip Extension 5/5, Knee Flexion 5/5, Knee Extension 5/5, Ankle Dorsiflexion 5/5,          Ankle Plantar Flexion 5/5, Extensor hallucis Longus 5/5         SILT L2-S1, No pain with SLR, Negative Clonus, Negative Babinski                  Left Lower Extremity: Hip Flexion 5/5, Hip Extension 5/5, Knee Flexion 5/5, Knee Extension 5/5, Ankle Dorsiflexion 4/5,          Ankle Plantar Flexion 5/5, Extensor hallucis Longus 4/5         SILT L2-S1, No pain with SLR, Negative Clonus, Negative Babinski

## 2018-09-13 NOTE — ED ADULT NURSE REASSESSMENT NOTE - NS ED NURSE REASSESS COMMENT FT1
md at bedside pending spinal tap, family at bedside no complaints pt resting family at bedside no complaints

## 2018-09-13 NOTE — PROGRESS NOTE ADULT - PROBLEM SELECTOR PLAN 1
- Await MRI results - Head and Lumbar spine  - Neurology and orthopedics consults appreciated   - PT pending  - OT for foot brace for L foot drop

## 2018-09-13 NOTE — PHYSICAL THERAPY INITIAL EVALUATION ADULT - PRECAUTIONS/LIMITATIONS, REHAB EVAL
CT head imaging at that time showed no acute findings , patient was provided a leg brace and scheduled to follow up with orthopedics.  On day of admission, patient was evaluated by orthopedics who recommended coming to the hospital for further work up. Pt w/PMH of CAD, GERD, HLD, HTN and Left Sciatic (5 years ago). CTH on 9/8:No acute intracranial hemorrhage or mass effect. No displaced calvarial fracture. CT cervical spine: No acute fracture or traumatic dilatation. No prevertebral soft tissue  swelling. Degenerative changes. MR Lspine: Mild degenerative disc disease at all lumbar levels. Moderate annular bulge L3-4 with ligamentous and facet hypertrophic changes and moderate spinal stenosis. Small left-sided disc herniation L4-5 with left lateral recess stenosis due to the disc herniations and ligamentous hypertrophy as well as moderate spinal stenosis. MR head: Tiny area of acute infarction involving the right posterior precentral gyrus with associated cytotoxic edema. No hemorrhagic transformation. Additional small chronic high right-sided MCA distribution infarcts, as  discussed. Multiple nonspecific abnormal white matter foci of T2/FLAIR prolongation statistically favoring microvascular disease. CT head imaging at that time showed no acute findings , patient was provided a leg brace and scheduled to follow up with orthopedics.  On day of admission, patient was evaluated by orthopedics who recommended coming to the hospital for further work up. Pt w/PMH of CAD, GERD, HLD, HTN and Left Sciatic (5 years ago). CTH on 9/8:No acute intracranial hemorrhage or mass effect. No displaced calvarial fracture. CT cervical spine: No acute fracture or traumatic dilatation. No prevertebral soft tissue  swelling. Degenerative changes. MR Lspine: Mild degenerative disc disease at all lumbar levels. Moderate annular bulge L3-4 with ligamentous and facet hypertrophic changes and moderate spinal stenosis. Small left-sided disc herniation L4-5 with left lateral recess stenosis due to the disc herniations and ligamentous hypertrophy as well as moderate spinal stenosis. MR head: Tiny area of acute infarction involving the right posterior precentral gyrus with associated cytotoxic edema. No hemorrhagic transformation. Additional small chronic high right-sided MCA distribution infarcts, as  discussed. Multiple nonspecific abnormal white matter foci of T2/FLAIR prolongation statistically favoring microvascular disease./fall precautions

## 2018-09-13 NOTE — CONSULT NOTE ADULT - SUBJECTIVE AND OBJECTIVE BOX
CHIEF COMPLAINT:Patient is a 73y old  Male who presents with a chief complaint of left lower extremity weakness x two weeks (13 Sep 2018 14:34)      HISTORY OF PRESENT ILLNESS:HPI:  Patient is a 73 year old male w/PMH of CAD, GERD, HLD, HTN and Left Sciatic (5 years ago) who is presenting with left foot drop for the past 2 weeks,  Patient reports worsening left leg weakness over two weeks , symptoms are localized to his  foot and toes,  during this time he reports recurrent falls including minor head trauma,  symptoms have been persistent not worsening . Patient reports no pain. No fever or chills , no other neurological deficits.  No urinary or bowel incontinence. He was seen in the emergency room several days prior to admission for fall , CT head imaging at that time showed no acute findings , patient was provided a leg brace and scheduled to follow up with orthopedics.  On day of admission, patient was evaluated by orthopedics who recommended coming to the hospital for further work up. (12 Sep 2018 23:39)      PAST MEDICAL & SURGICAL HISTORY:  GERD (gastroesophageal reflux disease)  CAD (coronary artery disease)  HLD (hyperlipidemia)  HTN (hypertension)  No significant past surgical history          MEDICATIONS:  aspirin enteric coated 81 milliGRAM(s) Oral daily  clopidogrel Tablet 75 milliGRAM(s) Oral daily  enoxaparin Injectable 40 milliGRAM(s) SubCutaneous every 24 hours  hydrochlorothiazide 12.5 milliGRAM(s) Oral daily  lisinopril 40 milliGRAM(s) Oral daily  metoprolol tartrate 50 milliGRAM(s) Oral two times a day        diphenhydrAMINE   Capsule 50 milliGRAM(s) Oral at bedtime PRN    pantoprazole    Tablet 40 milliGRAM(s) Oral before breakfast    atorvastatin 40 milliGRAM(s) Oral at bedtime    sodium chloride 0.9%. 1000 milliLiter(s) IV Continuous <Continuous>      FAMILY HISTORY:  No pertinent family history in first degree relatives      Non-contributory    SOCIAL HISTORY:    [ ] Tobacco  [ ] Drugs  [ ] Alcohol    Allergies    No Known Allergies    Intolerances    	    REVIEW OF SYSTEMS:  CONSTITUTIONAL: No fever  EYES: No eye pain, visual disturbances, or discharge  ENMT:  No difficulty hearing, tinnitus  NECK: No pain or stiffness  RESPIRATORY: No cough, wheezing,  CARDIOVASCULAR: No chest pain, palpitations, passing out, dizziness, or leg swelling  GASTROINTESTINAL:  No nausea, vomiting, diarrhea or constipation. No melena.  GENITOURINARY: No dysuria, hematuria  NEUROLOGICAL: No stroke like symptoms  SKIN: No burning or lesions   LYMPH Nodes: No enlarged glands  ENDOCRINE: No heat or cold intolerance  MUSCULOSKELETAL: No joint pain or swelling  PSYCHIATRIC: No  anxiety, mood swings  HEME/LYMPH: No bleeding gums  ALLERY AND IMMUNOLOGIC: No hives or eczema	    All other ROS negative    PHYSICAL EXAM:  T(C): 36.6 (09-13-18 @ 15:58), Max: 37.1 (09-12-18 @ 19:18)  HR: 61 (09-13-18 @ 15:58) (60 - 81)  BP: 162/85 (09-13-18 @ 15:58) (108/67 - 162/85)  RR: 18 (09-13-18 @ 15:58) (16 - 19)  SpO2: 96% (09-13-18 @ 15:58) (96% - 99%)  Wt(kg): --  I&O's Summary    13 Sep 2018 07:01  -  13 Sep 2018 19:13  --------------------------------------------------------  IN: 960 mL / OUT: 0 mL / NET: 960 mL        Appearance: Normal	  HEENT:   Normal oral mucosa, EOMI	  Cardiovascular: Normal S1 S2, No JVD, No murmurs  Respiratory: Lungs clear to auscultation	  Psychiatry: Alert, Mood & affect appropriate  Gastrointestinal:  Soft, Non-tender, + BS	  Skin: No rashes, No ecchymoses, No cyanosis	  Neurologic: Non-focal  Extremities:  No clubbing, cyanosis or edema  Vascular: Peripheral pulses palpable  bilaterally  	    	  	  CARDIAC MARKERS:                                  13.6   5.5   )-----------( 150      ( 13 Sep 2018 06:48 )             38.6     09-13    144  |  105  |  12  ----------------------------<  156<H>  3.8   |  28  |  1.11    Ca    8.9      13 Sep 2018 06:48    TPro  6.9  /  Alb  4.4  /  TBili  0.5  /  DBili  x   /  AST  21  /  ALT  21  /  AlkPhos  81  09-12          EKG:  Radiology:      Assessment /Plan:         Yosef Livingston DO [facility]  [phone] CHIEF COMPLAINT:Patient is a 73y old  Male who presents with a chief complaint of left lower extremity weakness x two weeks (13 Sep 2018 14:34)      HISTORY OF PRESENT ILLNESS:HPI:  Patient is a 73 year old male w/PMH of CAD, GERD, HLD, HTN and Left Sciatic (5 years ago) who is presenting with left foot drop for the past 2 weeks,  Patient reports worsening left leg weakness over two weeks , symptoms are localized to his  foot and toes,  during this time he reports recurrent falls including minor head trauma,  symptoms have been persistent not worsening . Patient reports no pain. No fever or chills , no other neurological deficits.  No urinary or bowel incontinence. He was seen in the emergency room several days prior to admission for fall , CT head imaging at that time showed no acute findings , patient was provided a leg brace and scheduled to follow up with orthopedics.  On day of admission, patient was evaluated by orthopedics who recommended coming to the hospital for further work up. (12 Sep 2018 23:39)      PAST MEDICAL & SURGICAL HISTORY:  GERD (gastroesophageal reflux disease)  CAD (coronary artery disease)  HLD (hyperlipidemia)  HTN (hypertension)  No significant past surgical history          MEDICATIONS:  aspirin enteric coated 81 milliGRAM(s) Oral daily  clopidogrel Tablet 75 milliGRAM(s) Oral daily  enoxaparin Injectable 40 milliGRAM(s) SubCutaneous every 24 hours  hydrochlorothiazide 12.5 milliGRAM(s) Oral daily  lisinopril 40 milliGRAM(s) Oral daily  metoprolol tartrate 50 milliGRAM(s) Oral two times a day        diphenhydrAMINE   Capsule 50 milliGRAM(s) Oral at bedtime PRN    pantoprazole    Tablet 40 milliGRAM(s) Oral before breakfast    atorvastatin 40 milliGRAM(s) Oral at bedtime    sodium chloride 0.9%. 1000 milliLiter(s) IV Continuous <Continuous>      FAMILY HISTORY:  No pertinent family history in first degree relatives      Non-contributory    SOCIAL HISTORY:    not a smoker    Allergies    No Known Allergies    Intolerances    	     Review of Systems:  Review of Systems: CONSTITUTIONAL: No weakness, fevers or chills  EYES/ENT: No visual changes;  No dysphagia  NECK: No pain or stiffness  RESPIRATORY: No cough, wheezing, hemoptysis; No shortness of breath  CARDIOVASCULAR: No chest pain or palpitations; No lower extremity edema  EXTREMITIES: no le edema, cyanosis, clubbing  GASTROINTESTINAL: No abdominal or epigastric pain. No nausea, vomiting, or hematemesis; No diarrhea or constipation. No melena or hematochezia.  BACK: No back pain  GENITOURINARY: No dysuria, frequency or hematuria  NEUROLOGICAL: No numbness + weakness  MSK: no joint swelling or pain  SKIN: No itching, burning, rashes, or lesions   PSYCH: no agitation All other review of systems is negative unless indicated above.	  	    All other ROS negative    PHYSICAL EXAM:  T(C): 36.6 (09-13-18 @ 15:58), Max: 37.1 (09-12-18 @ 19:18)  HR: 61 (09-13-18 @ 15:58) (60 - 81)  BP: 162/85 (09-13-18 @ 15:58) (108/67 - 162/85)  RR: 18 (09-13-18 @ 15:58) (16 - 19)  SpO2: 96% (09-13-18 @ 15:58) (96% - 99%)  Wt(kg): --  I&O's Summary    13 Sep 2018 07:01  -  13 Sep 2018 19:13  --------------------------------------------------------  IN: 960 mL / OUT: 0 mL / NET: 960 mL        GENERAL: No acute distress, well-developed  	HEAD:  Atraumatic, Normocephalic  	ENT: EOMI, PERRLA, conjunctiva and sclera clear,  moist mucosa no pharyngeal erythema or exudates   	NECK: supple , no JVD   	CHEST/LUNG: Clear to auscultation bilaterally; No wheeze, equal breath sounds bilaterally   	BACK: No spinal tenderness,  No CVA tenderness   	HEART: Regular rate and rhythm; No murmurs, rubs, or gallops  	ABDOMEN: Soft, Nontender, Nondistended; Bowel sounds present  	EXTREMITIES:  No clubbing, cyanosis, or edema  	MSK: No joint swelling or effusions, ROM intact   PSYCH: Normal behavior/affect  Neuro: refer to neurology consult  	    	  	  CARDIAC MARKERS:                                  13.6   5.5   )-----------( 150      ( 13 Sep 2018 06:48 )             38.6     09-13    144  |  105  |  12  ----------------------------<  156<H>  3.8   |  28  |  1.11    Ca    8.9      13 Sep 2018 06:48    TPro  6.9  /  Alb  4.4  /  TBili  0.5  /  DBili  x   /  AST  21  /  ALT  21  /  AlkPhos  81  09-12          EKG: NSR  Radiology:  < from: MR Head No Cont (09.13.18 @ 11:45) >  IMPRESSION: Tiny area of acute infarction involving the right posterior   precentral gyrus with associated cytotoxic edema. No hemorrhagic   transformation.    Additional small chronic high right-sided MCA distribution infarcts, as   discussed.    Multiple nonspecific abnormal white matter foci of T2/FLAIR prolongation   statistically favoring microvascular disease.    < end of copied text >      Assessment and Plan:    Assessment:  · Assessment		  73 M w/PMH of CAD, GERD, HLD, HTN and Left Sciatic (5 years ago) p/w left foot drop 2/2 CVA     Problem/Plan - 1:  ·  Problem: acute CVA  Plan: CVA Tx per neurology service, agree with ASA, statin  Pt considering ILR, wants to discuss with his sons first prior to deciding,   Carotid dopplers     Problem/Plan -   ·  Problem: CAD (coronary artery disease).  Plan: - ASA   - statin.      Problem/Plan - 3:  ·  Problem: HTN (hypertension).  Plan: - continue metoprolol  - continue ACE I   - continue HCTZ.      Problem/Plan - 4:  ·  Problem: GERD (gastroesophageal reflux disease).  Plan: - PPI.           Yosef Livingston DO [facility]  [facility]  [phone]

## 2018-09-14 PROBLEM — K21.9 GASTRO-ESOPHAGEAL REFLUX DISEASE WITHOUT ESOPHAGITIS: Chronic | Status: ACTIVE | Noted: 2018-09-12

## 2018-09-14 PROBLEM — I25.10 ATHEROSCLEROTIC HEART DISEASE OF NATIVE CORONARY ARTERY WITHOUT ANGINA PECTORIS: Chronic | Status: ACTIVE | Noted: 2018-09-12

## 2018-09-14 LAB
CHOLEST SERPL-MCNC: 103 MG/DL — SIGNIFICANT CHANGE UP (ref 10–199)
HBA1C BLD-MCNC: 5.6 % — SIGNIFICANT CHANGE UP (ref 4–5.6)
HDLC SERPL-MCNC: 30 MG/DL — LOW
LIPID PNL WITH DIRECT LDL SERPL: 53 MG/DL — SIGNIFICANT CHANGE UP
TOTAL CHOLESTEROL/HDL RATIO MEASUREMENT: 3.4 RATIO — SIGNIFICANT CHANGE UP (ref 3.4–9.6)
TRIGL SERPL-MCNC: 100 MG/DL — SIGNIFICANT CHANGE UP (ref 10–149)

## 2018-09-14 PROCEDURE — 93306 TTE W/DOPPLER COMPLETE: CPT | Mod: 26

## 2018-09-14 PROCEDURE — 99233 SBSQ HOSP IP/OBS HIGH 50: CPT

## 2018-09-14 RX ORDER — CALCIUM CARBONATE 500(1250)
2 TABLET ORAL DAILY
Qty: 0 | Refills: 0 | Status: DISCONTINUED | OUTPATIENT
Start: 2018-09-14 | End: 2018-09-18

## 2018-09-14 RX ORDER — CEFAZOLIN SODIUM 1 G
1000 VIAL (EA) INJECTION ONCE
Qty: 0 | Refills: 0 | Status: COMPLETED | OUTPATIENT
Start: 2018-09-14 | End: 2018-09-14

## 2018-09-14 RX ORDER — PANTOPRAZOLE SODIUM 20 MG/1
20 TABLET, DELAYED RELEASE ORAL ONCE
Qty: 0 | Refills: 0 | Status: COMPLETED | OUTPATIENT
Start: 2018-09-14 | End: 2018-09-14

## 2018-09-14 RX ADMIN — Medication 50 MILLIGRAM(S): at 21:13

## 2018-09-14 RX ADMIN — Medication 50 MILLIGRAM(S): at 00:10

## 2018-09-14 RX ADMIN — Medication 50 MILLIGRAM(S): at 09:23

## 2018-09-14 RX ADMIN — ENOXAPARIN SODIUM 40 MILLIGRAM(S): 100 INJECTION SUBCUTANEOUS at 22:34

## 2018-09-14 RX ADMIN — Medication 2 TABLET(S): at 21:13

## 2018-09-14 RX ADMIN — LISINOPRIL 40 MILLIGRAM(S): 2.5 TABLET ORAL at 09:23

## 2018-09-14 RX ADMIN — Medication 81 MILLIGRAM(S): at 11:14

## 2018-09-14 RX ADMIN — CLOPIDOGREL BISULFATE 75 MILLIGRAM(S): 75 TABLET, FILM COATED ORAL at 11:14

## 2018-09-14 RX ADMIN — ATORVASTATIN CALCIUM 40 MILLIGRAM(S): 80 TABLET, FILM COATED ORAL at 21:13

## 2018-09-14 RX ADMIN — Medication 100 MILLIGRAM(S): at 15:19

## 2018-09-14 RX ADMIN — Medication 12.5 MILLIGRAM(S): at 09:23

## 2018-09-14 RX ADMIN — PANTOPRAZOLE SODIUM 40 MILLIGRAM(S): 20 TABLET, DELAYED RELEASE ORAL at 05:13

## 2018-09-14 RX ADMIN — Medication 50 MILLIGRAM(S): at 22:34

## 2018-09-14 NOTE — CONSULT NOTE ADULT - ASSESSMENT
73 M w/PMH of CAD, GERD, HLD, HTN and Left Sciatic (5 years ago) p/w left foot drop 2/2 CVA. EP consulted for ILR.     CVA  -Patient and family in agreement for ILR   -Ancef 1 gram x 1 IV 30 min prior to ILR    #[id number]    Per  neuro the patient no longer requires an ILR implant secondary to carotid artery findings on CT Scan.    #[id number] 73 M w/PMH of CAD, GERD, HLD, HTN and Left Sciatic (5 years ago) p/w left foot drop 2/2 CVA. EP consulted for ILR.     CVA  -Patient and family in agreement for ILR to further evaluate for arrythmia ie: A Fib/ Aflutter   -Ancef 1 gram x 1 IV 30 min prior to ILR    #[id number]    Per  neuro the patient no longer requires an ILR implant secondary to carotid artery findings on CT Scan.    #[id number]

## 2018-09-14 NOTE — CONSULT NOTE ADULT - SUBJECTIVE AND OBJECTIVE BOX
CHIEF COMPLAINT: "I cant move my left foot"     HISTORY OF PRESENT ILLNESS:  73 year old male w/PMH of CAD, GERD, HLD, HTN and Left Sciatic (5 years ago) who is presenting with left foot drop for the past 2 weeks,    Allergies    No Known Allergies  	    MEDICATIONS:  aspirin enteric coated 81 milliGRAM(s) Oral daily  clopidogrel Tablet 75 milliGRAM(s) Oral daily  enoxaparin Injectable 40 milliGRAM(s) SubCutaneous every 24 hours  hydrochlorothiazide 12.5 milliGRAM(s) Oral daily  lisinopril 40 milliGRAM(s) Oral daily  metoprolol tartrate 50 milliGRAM(s) Oral two times a day        diphenhydrAMINE   Capsule 50 milliGRAM(s) Oral at bedtime PRN    pantoprazole    Tablet 40 milliGRAM(s) Oral before breakfast    atorvastatin 40 milliGRAM(s) Oral at bedtime    sodium chloride 0.9%. 1000 milliLiter(s) IV Continuous <Continuous>      PAST MEDICAL & SURGICAL HISTORY:  GERD (gastroesophageal reflux disease)  CAD (coronary artery disease)  HLD (hyperlipidemia)  HTN (hypertension)  No significant past surgical history      FAMILY HISTORY:  No pertinent family history in first degree relatives      SOCIAL HISTORY:    [ ] Non-smoker  [ ] Smoker  [ ] Alcohol      REVIEW OF SYSTEMS:  See HPI. Otherwise, 10 point ROS done and otherwise negative.    PHYSICAL EXAM:  T(C): 36.7 (09-14-18 @ 15:35), Max: 37 (09-13-18 @ 23:21)  HR: 72 (09-14-18 @ 15:35) (54 - 72)  BP: 154/81 (09-14-18 @ 15:35) (115/68 - 154/81)  RR: 18 (09-14-18 @ 15:35) (18 - 18)  SpO2: 97% (09-14-18 @ 15:35) (96% - 97%)  Wt(kg): --  I&O's Summary    13 Sep 2018 07:01  -  14 Sep 2018 07:00  --------------------------------------------------------  IN: 2455 mL / OUT: 0 mL / NET: 2455 mL    14 Sep 2018 07:01  -  14 Sep 2018 16:00  --------------------------------------------------------  IN: 750 mL / OUT: 0 mL / NET: 750 mL        Appearance: Normal	  HEENT:   Normal oral mucosa, PERRL, EOMI	  Lymphatic: No lymphadenopathy  Cardiovascular: Normal S1 S2, No JVD, No murmurs, No edema  Respiratory: Lungs clear to auscultation	  Psychiatry: A & O x 3, Mood & affect appropriate  Gastrointestinal:  Soft, Non-tender, + BS	  Skin: No rashes, No ecchymoses, No cyanosis	  Neurologic: Non-focal  Extremities: Normal range of motion, No clubbing, cyanosis or edema  Vascular: Peripheral pulses palpable 2+ bilaterally        LABS:	 	    CBC Full  -  ( 13 Sep 2018 06:48 )  WBC Count : 5.5 K/uL  Hemoglobin : 13.6 g/dL  Hematocrit : 38.6 %  Platelet Count - Automated : 150 K/uL  Mean Cell Volume : 93.5 fl  Mean Cell Hemoglobin : 33.0 pg  Mean Cell Hemoglobin Concentration : 35.3 gm/dL  Auto Neutrophil # : x  Auto Lymphocyte # : x  Auto Monocyte # : x  Auto Eosinophil # : x  Auto Basophil # : x  Auto Neutrophil % : x  Auto Lymphocyte % : x  Auto Monocyte % : x  Auto Eosinophil % : x  Auto Basophil % : x    09-13    144  |  105  |  12  ----------------------------<  156<H>  3.8   |  28  |  1.11  09-12    138  |  100  |  15  ----------------------------<  143<H>  3.9   |  26  |  1.26    Ca    8.9      13 Sep 2018 06:48  Ca    9.4      12 Sep 2018 16:21    TPro  6.9  /  Alb  4.4  /  TBili  0.5  /  DBili  x   /  AST  21  /  ALT  21  /  AlkPhos  81  09-12      proBNP:   Lipid Profile:   HgA1c: Hemoglobin A1C, Whole Blood: 5.6 % (09-14 @ 07:57)    TSH:       CARDIAC MARKERS:                TELEMETRY: 	    ECG:  	  RADIOLOGY:  OTHER: 	    PREVIOUS DIAGNOSTIC TESTING:    [ ] Echocardiogram:  [ ]  Catheterization:  [ ] Stress Test:  	  	  ASSESSMENT/PLAN: CHIEF COMPLAINT: "I cant move my left foot"     HISTORY OF PRESENT ILLNESS:  73 year old male w/PMH of CAD, GERD, HLD, HTN and Left Sciatic (5 years ago) who is presenting with left foot drop for the past 2 weeks.     Allergies    No Known Allergies  	    MEDICATIONS:  aspirin enteric coated 81 milliGRAM(s) Oral daily  clopidogrel Tablet 75 milliGRAM(s) Oral daily  enoxaparin Injectable 40 milliGRAM(s) SubCutaneous every 24 hours  hydrochlorothiazide 12.5 milliGRAM(s) Oral daily  lisinopril 40 milliGRAM(s) Oral daily  metoprolol tartrate 50 milliGRAM(s) Oral two times a day      diphenhydrAMINE   Capsule 50 milliGRAM(s) Oral at bedtime PRN    pantoprazole    Tablet 40 milliGRAM(s) Oral before breakfast    atorvastatin 40 milliGRAM(s) Oral at bedtime    sodium chloride 0.9%. 1000 milliLiter(s) IV Continuous <Continuous>      PAST MEDICAL & SURGICAL HISTORY:  GERD (gastroesophageal reflux disease)  CAD (coronary artery disease)  HLD (hyperlipidemia)  HTN (hypertension)  No significant past surgical history      FAMILY HISTORY:  No pertinent family history in first degree relatives      SOCIAL HISTORY:    [x ] Non-smoker  [ ] Smoker  [x ] denies Alcohol      REVIEW OF SYSTEMS:  See HPI. Otherwise, 10 point ROS done and otherwise negative.    PHYSICAL EXAM:  T(C): 36.7 (09-14-18 @ 15:35), Max: 37 (09-13-18 @ 23:21)  HR: 72 (09-14-18 @ 15:35) (54 - 72)  BP: 154/81 (09-14-18 @ 15:35) (115/68 - 154/81)  RR: 18 (09-14-18 @ 15:35) (18 - 18)  SpO2: 97% (09-14-18 @ 15:35) (96% - 97%)    13 Sep 2018 07:01  -  14 Sep 2018 07:00  --------------------------------------------------------  IN: 2455 mL / OUT: 0 mL / NET: 2455 mL    14 Sep 2018 07:01  -  14 Sep 2018 16:00  --------------------------------------------------------  IN: 750 mL / OUT: 0 mL / NET: 750 mL      Appearance: Normal	  Cardiovascular: Normal S1 S2, regular.  No JVD, No murmurs, No edema  Respiratory: Lungs clear to auscultation	  Psychiatry: A & O x 3, Mood & affect appropriate  Gastrointestinal:  Soft, Non-tender, + BS	  Skin: No rashes, No ecchymoses, No cyanosis	  Neurologic: Non-focal  Extremities: Limited range of motion left foot.  No clubbing, cyanosis or edema  Vascular: Peripheral pulses palpable 2+ bilaterally      LABS:	 	    CBC Full  -  ( 13 Sep 2018 06:48 )  WBC Count : 5.5 K/uL  Hemoglobin : 13.6 g/dL  Hematocrit : 38.6 %  Platelet Count - Automated : 150 K/uL  Mean Cell Volume : 93.5 fl  Mean Cell Hemoglobin : 33.0 pg  Mean Cell Hemoglobin Concentration : 35.3 gm/dL  Auto Neutrophil # : x  Auto Lymphocyte # : x  Auto Monocyte # : x  Auto Eosinophil # : x  Auto Basophil # : x  Auto Neutrophil % : x  Auto Lymphocyte % : x  Auto Monocyte % : x  Auto Eosinophil % : x  Auto Basophil % : x    09-13    144  |  105  |  12  ----------------------------<  156<H>  3.8   |  28  |  1.11  09-12    138  |  100  |  15  ----------------------------<  143<H>  3.9   |  26  |  1.26    Ca    8.9      13 Sep 2018 06:48  Ca    9.4      12 Sep 2018 16:21    TPro  6.9  /  Alb  4.4  /  TBili  0.5  /  DBili  x   /  AST  21  /  ALT  21  /  AlkPhos  81  09-12      proBNP:   Lipid Profile:   HgA1c: Hemoglobin A1C, Whole Blood: 5.6 % (09-14 @ 07:57)      TELEMETRY: 	 NSR  60's   ECG: NSR

## 2018-09-14 NOTE — PROGRESS NOTE ADULT - ASSESSMENT
Patient is a 73 year old male w/PMH of CAD, GERD, HLD, HTN and Left Sciatic (5 years ago) who presented with left foot drop for the past 2 weeks.Patient reported worsening left leg weakness over two weeks , symptoms are localized to his  foot and toes,  during this time he reported recurrent falls including minor head trauma,  symptoms have been persistent not worsening . Patient reported no pain. No fever or chills , no other neurological deficits.  No urinary or bowel incontinence. He was seen in the emergency room several days prior to admission for fall , CT head imaging at that time showed no acute findings , patient was provided a leg brace and scheduled to follow up with orthopedics.  On day of admission, patient was evaluated by orthopedics who recommended coming to the hospital for further work up.  Neurology consulted for the MRI head results.    NEURO: Continue close monitoring for neurologic deterioration, permissive HTN, titrate statin to LDL goal less than 70, MRI Brain w/o, MRA Head w/o and Neck w/contrast noted above. Vascular consulted for the carotid stenosis.  Physical therapy/OT recommends home with outpatient services.    ANTITHROMBOTIC THERAPY: ASA/Plavix    PULMONARY: CXR clear (09/13), protecting airway, saturating well     CARDIOVASCULAR: check TTE, cardiac monitoring                              SBP goal: normotension    GASTROINTESTINAL:  dysphagia screen- passed, tolerating diet      Diet: Regular DASH    RENAL: BUN/Cr stable, good urine output      Na Goal: Greater than 135     Silvestre:    HEMATOLOGY: H/H w, Platelets normal      DVT ppx: Heparin s.c [] LMWH []     ID: afebrile, no leukocytosis     OTHER: Family at bedside, all questions and concerns addressed.    DISPOSITION: Home once stable and workup is complete      CORE MEASURES:        Admission NIHSS:      TPA: [] YES [X] NO      LDL/HDL: 53/30     Depression Screen: 0     Statin Therapy:     Dysphagia Screen: [] PASS [] FAIL     Smoking [] YES [] NO      Afib [] YES [] NO     Stroke Education [] YES [] NO Patient is a 73 year old male w/PMH of CAD, GERD, HLD, HTN and Left Sciatic (5 years ago) who presented with left foot drop for the past 2 weeks.Patient reported worsening left leg weakness over two weeks , symptoms are localized to his  foot and toes,  during this time he reported recurrent falls including minor head trauma,  symptoms have been persistent not worsening . Patient reported no pain. No fever or chills , no other neurological deficits.  No urinary or bowel incontinence. He was seen in the emergency room several days prior to admission for fall , CT head imaging at that time showed no acute findings , patient was provided a leg brace and scheduled to follow up with orthopedics.  On day of admission, patient was evaluated by orthopedics who recommended coming to the hospital for further work up.  Neurology consulted for the MRI head results.  Impression: Acute Infarct    NEURO: Continue close monitoring for neurologic deterioration, permissive HTN, titrate statin to LDL goal less than 70, MRI Brain w/o, MRA Head w/o and Neck w/contrast noted above. Vascular consulted for the carotid stenosis.  Physical therapy/OT recommends home with outpatient services.    ANTITHROMBOTIC THERAPY: ASA/Plavix    PULMONARY: CXR clear (09/13), protecting airway, saturating well     CARDIOVASCULAR: check TTE, cardiac monitoring                              SBP goal: normotension    GASTROINTESTINAL:  dysphagia screen- passed, tolerating diet      Diet: Regular DASH    RENAL: BUN/Cr without acute change, good urine output      Na Goal: Greater than 135     Silvestre: N    HEMATOLOGY: H/H without acute change, Platelets 150     DVT ppx: Heparin s.c [] LMWH [X]     ID: afebrile, no leukocytosis     OTHER: Family at bedside, all questions and concerns addressed. Vascular consulted for 70% narrowing of the right ICA.    DISPOSITION: Home once stable and workup is complete      CORE MEASURES:        Admission NIHSS:      TPA: [] YES [X] NO      LDL/HDL: 53/30     Depression Screen: 0     Statin Therapy:     Dysphagia Screen: [] PASS [] FAIL     Smoking [] YES [] NO      Afib [] YES [] NO     Stroke Education [] YES [] NO

## 2018-09-15 PROCEDURE — 99233 SBSQ HOSP IP/OBS HIGH 50: CPT

## 2018-09-15 PROCEDURE — 93880 EXTRACRANIAL BILAT STUDY: CPT | Mod: 26

## 2018-09-15 RX ORDER — FUROSEMIDE 40 MG
20 TABLET ORAL ONCE
Qty: 0 | Refills: 0 | Status: COMPLETED | OUTPATIENT
Start: 2018-09-16 | End: 2018-09-16

## 2018-09-15 RX ADMIN — Medication 12.5 MILLIGRAM(S): at 11:22

## 2018-09-15 RX ADMIN — Medication 1 MILLIGRAM(S): at 22:35

## 2018-09-15 RX ADMIN — PANTOPRAZOLE SODIUM 40 MILLIGRAM(S): 20 TABLET, DELAYED RELEASE ORAL at 06:14

## 2018-09-15 RX ADMIN — PANTOPRAZOLE SODIUM 20 MILLIGRAM(S): 20 TABLET, DELAYED RELEASE ORAL at 00:11

## 2018-09-15 RX ADMIN — Medication 50 MILLIGRAM(S): at 22:03

## 2018-09-15 RX ADMIN — ATORVASTATIN CALCIUM 40 MILLIGRAM(S): 80 TABLET, FILM COATED ORAL at 21:37

## 2018-09-15 RX ADMIN — LISINOPRIL 40 MILLIGRAM(S): 2.5 TABLET ORAL at 11:22

## 2018-09-15 RX ADMIN — CLOPIDOGREL BISULFATE 75 MILLIGRAM(S): 75 TABLET, FILM COATED ORAL at 11:22

## 2018-09-15 RX ADMIN — Medication 50 MILLIGRAM(S): at 11:22

## 2018-09-15 RX ADMIN — Medication 81 MILLIGRAM(S): at 11:22

## 2018-09-15 RX ADMIN — ENOXAPARIN SODIUM 40 MILLIGRAM(S): 100 INJECTION SUBCUTANEOUS at 21:38

## 2018-09-15 NOTE — CONSULT NOTE ADULT - REASON FOR ADMISSION
left lower extremity weakness x two weeks

## 2018-09-15 NOTE — PROGRESS NOTE ADULT - ASSESSMENT
Patient is a 73 year old male w/PMH of CAD, GERD, HLD, HTN and Left Sciatic (5 years ago) who presented with left foot drop for the past 2 weeks prior to admission. Patient reported worsening left leg weakness over two weeks , symptoms are localized to his  foot and toes,  during this time he reported recurrent falls including minor head trauma,  symptoms have been persistent not worsening . Patient reported no pain. No fever or chills , no other neurological deficits.  No urinary or bowel incontinence. He was seen in the emergency room several days prior to admission for fall , CT head imaging at that time showed no acute findings , patient was provided a leg brace and scheduled to follow up with orthopedics.  On day of admission, patient was evaluated by orthopedics who recommended coming to the hospital for further work up. CTA neck shows 70% narrowing of the right internal carotid artery, Brain MRI shows Right MCA ischemic infarct  Impression:  Right MCA ischemic infarct likely due to CJ stenosis.     NEURO: Continue close monitoring for neurologic deterioration, permissive HTN, titrate statin to LDL goal less than 70, MRI Brain w/o, MRA Head w/o and Neck w/contrast noted above. Plan for possible Right CEA, Dr Fong (vascular sx) eval appreciated. Physical therapy/OT recommends home with outpatient services.    ANTITHROMBOTIC THERAPY: ASA/Plavix for secondary stroke prevention in the setting of carotis stenosis    PULMONARY: CXR clear (09/13), protecting airway, saturating well     CARDIOVASCULAR: TTE shows Moderate mitral regurgitation, Severely dilated left atrium,  left ventricular remodeling, diastolic LV dysfunction, EF 70-75%, cardiac monitoring no events. Pt evaluated by Dr Navas (cardiology), Will also contact Dr Sabillon (pt's private cardiologist)                             SBP goal: normotension    GASTROINTESTINAL:  Ulcer prophylaxis, dysphagia screen- passed, tolerating diet      Diet: Regular DASH    RENAL: Good urine output      Na Goal: Greater than 135     Silvestre: N    HEMATOLOGY: No signs, symptoms of active bleeding     DVT ppx:  LMWH      ID: afebrile, no signs, symptoms of infection    OTHER: Family at bedside, all questions and concerns addressed.    DISPOSITION: Home once stable and workup is complete      CORE MEASURES:        Admission NIHSS:      TPA: NO      LDL/HDL: 53/30     Depression Screen: 0     Statin Therapy:     Dysphagia Screen: PASS     Smoking  NO      Afib NO     Stroke Education YES Patient is a 73 year old male w/PMH of CAD, GERD, HLD, HTN and Left Sciatic (5 years ago) who presented with left foot drop for the past 2 weeks prior to admission. Patient reported worsening left leg weakness over two weeks , symptoms are localized to his  foot and toes,  during this time he reported recurrent falls including minor head trauma,  symptoms have been persistent not worsening. He was seen in the emergency room several days prior to admission for fall , CT head imaging at that time showed no acute findings. On day of admission, patient was evaluated by orthopedics who recommended coming to the hospital for further work up. CTA neck shows 70% narrowing of the right internal carotid artery, Brain MRI shows Right MCA ischemic infarct  Impression:  Right MCA ischemic infarct likely due to CJ stenosis.     NEURO: Continue close monitoring for neurologic deterioration, permissive HTN, titrate statin to LDL goal less than 70, MRI Brain w/o, MRA Head w/o and Neck w/contrast noted above. Plan for possible Right CEA, Dr Fong (vascular sx) eval appreciated. Physical therapy/OT recommends home with outpatient services.    ANTITHROMBOTIC THERAPY: ASA/Plavix for secondary stroke prevention in the setting of carotis stenosis    PULMONARY: CXR clear (09/13), protecting airway, saturating well     CARDIOVASCULAR: TTE shows Moderate mitral regurgitation, Severely dilated left atrium,  left ventricular remodeling, diastolic LV dysfunction, EF 70-75%, cardiac monitoring no events. Pt evaluated by Dr Navas (cardiology), Will also contact Dr Sabillon (pt's private cardiologist)                             SBP goal: normotension    GASTROINTESTINAL:  Ulcer prophylaxis, dysphagia screen- passed, tolerating diet      Diet: Regular DASH    RENAL: Good urine output      Na Goal: Greater than 135     Silvestre: N    HEMATOLOGY: No signs, symptoms of active bleeding     DVT ppx:  LMWH      ID: afebrile, no signs, symptoms of infection    OTHER: Family at bedside, all questions, plan/goals of care and concerns addressed.    DISPOSITION: Home once stable and workup is complete      CORE MEASURES:        Admission NIHSS:      TPA: NO      LDL/HDL: 53/30     Depression Screen: 0     Statin Therapy:     Dysphagia Screen: PASS     Smoking  NO      Afib NO     Stroke Education YES

## 2018-09-15 NOTE — CONSULT NOTE ADULT - SUBJECTIVE AND OBJECTIVE BOX
VASCULAR SURGERY CONSULT NOTE  --------------------------------------------------------------------------------------------    HPI:  Patient is a 73 year old male w/PMH of CAD, GERD, HLD, HTN and Left Sciatic (5 years ago) who is presenting with left foot drop for the past 2 weeks,  Patient reports worsening left leg weakness over two weeks , symptoms are localized to his  foot and toes,  during this time he reports recurrent falls including minor head trauma,  symptoms have been persistent not worsening . Patient reports no pain. No fever or chills , no other neurological deficits.  No urinary or bowel incontinence. He was seen in the emergency room several days prior to admission for fall , CT head imaging at that time showed no acute findings, patient was provided a leg brace and scheduled to follow up with orthopedics.  On day of admission, patient was evaluated by orthopedics who recommended coming to the hospital for further work up.     ROS: 10-system review is otherwise negative except HPI above.        PAST MEDICAL & SURGICAL HISTORY:  GERD (gastroesophageal reflux disease)  CAD (coronary artery disease)  HLD (hyperlipidemia)  HTN (hypertension)  No significant past surgical history    FAMILY HISTORY:  No pertinent family history in first degree relatives  [] Family history not pertinent as reviewed with the patient and family      ALLERGIES: No Known Allergies      CURRENT MEDICATIONS  MEDICATIONS (STANDING): aspirin enteric coated 81 milliGRAM(s) Oral daily  atorvastatin 40 milliGRAM(s) Oral at bedtime  clopidogrel Tablet 75 milliGRAM(s) Oral daily  enoxaparin Injectable 40 milliGRAM(s) SubCutaneous every 24 hours  hydrochlorothiazide 12.5 milliGRAM(s) Oral daily  lisinopril 40 milliGRAM(s) Oral daily  metoprolol tartrate 50 milliGRAM(s) Oral two times a day  pantoprazole    Tablet 40 milliGRAM(s) Oral before breakfast    MEDICATIONS (PRN):calcium carbonate    500 mG (Tums) Chewable 2 Tablet(s) Chew daily PRN Heartburn  diphenhydrAMINE   Capsule 50 milliGRAM(s) Oral at bedtime PRN Insomnia    --------------------------------------------------------------------------------------------    Vitals:   T(C): 36.5 (09-15-18 @ 04:27), Max: 36.7 (09-14-18 @ 12:07)  HR: 65 (09-15-18 @ 04:27) (57 - 72)  BP: 109/65 (09-15-18 @ 04:27) (109/65 - 154/81)  RR: 18 (09-15-18 @ 04:27) (18 - 18)  SpO2: 96% (09-15-18 @ 04:27) (96% - 97%)  CAPILLARY BLOOD GLUCOSE          09-13 @ 07:01  -  09-14 @ 07:00  --------------------------------------------------------  IN:    Oral Fluid: 1080 mL    sodium chloride 0.9%: 1375 mL  Total IN: 2455 mL    OUT:  Total OUT: 0 mL    Total NET: 2455 mL      09-14 @ 07:01  -  09-15 @ 06:54  --------------------------------------------------------  IN:    IV PiggyBack: 50 mL    Oral Fluid: 700 mL  Total IN: 750 mL    OUT:  Total OUT: 0 mL    Total NET: 750 mL        Height (cm): 170.18 (09-08 @ 11:11)  Weight (kg): 90.7 (09-12 @ 14:35)  BMI (kg/m2): 31.3 (09-12 @ 14:35)  BSA (m2): 2.02 (09-12 @ 14:35)    PHYSICAL EXAM:  General: NAD, Lying in bed comfortably  Neuro: A+Ox3  HEENT: NC/AT, EOMI  Neck: Soft, supple  Cardio: RRR, nml S1/S2  Resp: Good effort, CTA b/l  GI/Abd: Soft, NT/ND, no rebound/guarding, no masses palpated  Vascular: All 4 extremities warm.  Skin: Intact, no breakdown  Musculoskeletal: All 4 extremities moving spontaneously, no limitations  --------------------------------------------------------------------------------------------    LABS                --------------------------------------------------------------------------------------------    MICROBIOLOGY      --------------------------------------------------------------------------------------------    IMAGING    < from: CT Angio Head w/ IV Cont (09.13.18 @ 22:45) >  FINDINGS:     CT BRAIN:    There is no CT evidence of acute intracranial hemorrhage, extra-axial   collection, edema, mass effect, central herniation, or hydrocephalus.     There is age-appropriate cerebral volume loss and patchy white matter   hypoattenuation which is nonspecific in etiology but likely related to   chronic microvascular ischemic disease.    The visualized paranasal sinuses are clear. The mastoid air cells and   middle ear cavities are clear.    The soft tissues of the scalp are unremarkable. The calvarium is intact.    CT ANGIOGRAPHY NECK:    Three-vessel aortic arch. Atherosclerotic calcifications of the aorta   without evidence of dissection or hemodynamically significant stenosis.   The origins of the great vessels are unremarkable.     The common carotid arteries are normal in caliber without significant   stenosis.     The carotid bifurcations are unremarkable. There is 70% narrowing of the   right internal carotid artery by NASCET criteria (series 10 image 390).   There is approximately 50-69% narrowing at the origin and proximal left   carotid artery.  Predominantly soft and calcified plaque    Co-dominant vertebral arteries. The vertebral arteries are normal in   caliber without significant stenosis.     The visualized neck and upper chest are unremarkable.    Visualized osseous structures are unremarkable.    CT ANGIOGRAPHY BRAIN:    Limited evaluation secondary to suboptimal opacification of the cerebral   arteries. There is no evidence for significant stenosis, major vessel   occlusion, or aneurysm about the [place]    There is no evidence for significant stenosis, major vessel occlusion, or   aneurysm involving the vertebrobasilar system.    No enlarged vascular lesions or clusters of abnormal vessels are notedto   suggest an arterial venous malformation.    Visualized portions of the superficial and deep venous systems are   unremarkable.      IMPRESSION:     CT brain: There is no CT evidence of acute intracranial hemorrhage,   extra-axial collection, edema, mass effect, central herniation, or   hydrocephalus.    CT angiography neck: 70% narrowing of the right internal carotid artery   by NASCET criteria. No evidence of vascular dissection. Moderate,   stenosis origin left internal carotid artery corresponding to 50-69%   narrowing.     CT angiography brain: No major vessel occlusion or proximal stenosis by   NASCET criteria. No evidence of aneurysm or other vascular malformation.    < end of copied text >

## 2018-09-15 NOTE — CONSULT NOTE ADULT - ASSESSMENT
ASSESSMENT: Patient is a 73M admitted for stroke, found to have 70% narrowing of the right ICA, vascular consulted for CEA workup.    PLAN:   - Please obtain arterial duplex bilateral carotid arteries  - Vascular surgery will continue to follow    Discussed with vascular surgery fellow    Rio Peña, PGY-2  Vascular Surgery [phone]

## 2018-09-16 PROCEDURE — 99233 SBSQ HOSP IP/OBS HIGH 50: CPT

## 2018-09-16 RX ORDER — METOPROLOL TARTRATE 50 MG
25 TABLET ORAL
Qty: 0 | Refills: 0 | Status: DISCONTINUED | OUTPATIENT
Start: 2018-09-16 | End: 2018-09-18

## 2018-09-16 RX ADMIN — Medication 25 MILLIGRAM(S): at 21:42

## 2018-09-16 RX ADMIN — Medication 20 MILLIGRAM(S): at 06:24

## 2018-09-16 RX ADMIN — CLOPIDOGREL BISULFATE 75 MILLIGRAM(S): 75 TABLET, FILM COATED ORAL at 11:24

## 2018-09-16 RX ADMIN — PANTOPRAZOLE SODIUM 40 MILLIGRAM(S): 20 TABLET, DELAYED RELEASE ORAL at 06:24

## 2018-09-16 RX ADMIN — LISINOPRIL 40 MILLIGRAM(S): 2.5 TABLET ORAL at 10:19

## 2018-09-16 RX ADMIN — Medication 81 MILLIGRAM(S): at 11:24

## 2018-09-16 RX ADMIN — Medication 50 MILLIGRAM(S): at 10:18

## 2018-09-16 RX ADMIN — ENOXAPARIN SODIUM 40 MILLIGRAM(S): 100 INJECTION SUBCUTANEOUS at 21:42

## 2018-09-16 RX ADMIN — Medication 1 MILLIGRAM(S): at 22:21

## 2018-09-16 RX ADMIN — ATORVASTATIN CALCIUM 40 MILLIGRAM(S): 80 TABLET, FILM COATED ORAL at 21:42

## 2018-09-16 RX ADMIN — Medication 12.5 MILLIGRAM(S): at 10:19

## 2018-09-16 NOTE — PROGRESS NOTE ADULT - ASSESSMENT
Patient is a 73 year old male w/PMH of CAD, GERD, HLD, HTN and Left Sciatic (5 years ago) who presented with left foot drop for the past 2 weeks prior to admission. Patient reported worsening left leg weakness over two weeks , symptoms are localized to his  foot and toes,  during this time he reported recurrent falls including minor head trauma,  symptoms have been persistent not worsening. He was seen in the emergency room several days prior to admission for fall , CT head imaging at that time showed no acute findings. On day of admission, patient was evaluated by orthopedics who recommended coming to the hospital for further work up. CTA neck shows 70% narrowing of the right internal carotid artery, Brain MRI shows Right MCA ischemic infarct  Impression:  Right MCA ischemic infarct likely due to CJ stenosis.     NEURO: Continue close monitoring for neurologic deterioration, permissive HTN, titrate statin to LDL goal less than 70, MRI Brain w/o, MRA Head w/o and Neck w/contrast noted above. Plan for possible Right CEA, Dr Fong (vascular sx) eval appreciated. Physical therapy/OT recommends home with outpatient services.    ANTITHROMBOTIC THERAPY: ASA/Plavix for secondary stroke prevention in the setting of carotis stenosis    PULMONARY: CXR clear (09/13), protecting airway, saturating well     CARDIOVASCULAR: TTE shows moderate mitral regurgitation, Severely dilated left atrium,  left ventricular remodeling, diastolic LV dysfunction, EF 70-75%, cardiac monitoring no events. Pt evaluated by Dr Navas (cardiology), plan for ALISON to r/o LA thrombus, and  LOOP to monitor for occult arrhythmias once stable after CEA                           SBP goal: normotension    GASTROINTESTINAL:  Ulcer prophylaxis, dysphagia screen- passed, tolerating diet. NPO  after midnight for possible CEA      Diet: Regular DASH    RENAL: Good urine output      Na Goal: Greater than 135     Silvestre: N    HEMATOLOGY: No signs, symptoms of active bleeding     DVT ppx:  LMWH      ID: afebrile, no signs, symptoms of infection    OTHER: Family at bedside, all questions, plan/goals of care and concerns addressed.    DISPOSITION: Home once stable and workup is complete      CORE MEASURES:        Admission NIHSS:      TPA: NO      LDL/HDL: 53/30     Depression Screen: 0     Statin Therapy:     Dysphagia Screen: PASS     Smoking  NO      Afib NO     Stroke Education YES Patient is a 73 year old male w/PMH of CAD, GERD, HLD, HTN and Left Sciatic (5 years ago) who presented with left foot drop for the past 2 weeks prior to admission. Patient reported worsening left leg weakness over two weeks , symptoms are localized to his  foot and toes,  during this time he reported recurrent falls including minor head trauma,  symptoms have been persistent not worsening. He was seen in the emergency room several days prior to admission for fall , CT head imaging at that time showed no acute findings. On day of admission, patient was evaluated by orthopedics who recommended coming to the hospital for further work up. CTA neck shows 70% narrowing of the right internal carotid artery, Brain MRI shows Right MCA ischemic infarct  Impression:  Right MCA ischemic infarct likely due to CJ stenosis.     NEURO: Continue close monitoring for neurologic deterioration, permissive HTN, titrate statin to LDL goal less than 70, MRI Brain w/o, MRA Head w/o and Neck w/contrast noted above. Plan for possible Right CEA, Dr Fong (vascular sx) eval appreciated. Physical therapy/OT recommends home with outpatient services.    ANTITHROMBOTIC THERAPY: ASA/Plavix for secondary stroke prevention in the setting of carotis stenosis    PULMONARY: CXR clear (09/13), protecting airway, saturating well     CARDIOVASCULAR: TTE shows moderate mitral regurgitation, Severely dilated left atrium,  left ventricular remodeling, diastolic LV dysfunction, EF 70-75%, cardiac monitoring no events. Pt evaluated by Dr Navas (cardiology), plan for ALISON to r/o LA thrombus, and  LOOP to monitor for occult arrhythmias once stable after CEA, will decreased lopressor to 25mg BID                           SBP goal: normotension    GASTROINTESTINAL:  Ulcer prophylaxis, dysphagia screen- passed, tolerating diet. NPO  after midnight for possible CEA      Diet: Regular DASH    RENAL: Good urine output      Na Goal: Greater than 135     Silvestre: N    HEMATOLOGY: No signs, symptoms of active bleeding     DVT ppx:  LMWH      ID: afebrile, no signs, symptoms of infection    OTHER: Family at bedside, all questions, plan/goals of care and concerns addressed.    DISPOSITION: Home once stable and workup is complete      CORE MEASURES:        Admission NIHSS:      TPA: NO      LDL/HDL: 53/30     Depression Screen: 0     Statin Therapy:     Dysphagia Screen: PASS     Smoking  NO      Afib NO     Stroke Education YES Patient is a 73 year old male w/PMH of CAD, GERD, HLD, HTN and Left Sciatic (5 years ago) who presented with left foot drop for the past 2 weeks prior to admission. Patient reported worsening left leg weakness over two weeks , symptoms are localized to his  foot and toes,  during this time he reported recurrent falls including minor head trauma,  symptoms have been persistent not worsening. He was seen in the emergency room several days prior to admission for fall , CT head imaging at that time showed no acute findings. On day of admission, patient was evaluated by orthopedics who recommended coming to the hospital for further work up. CTA neck shows 70% narrowing of the right internal carotid artery, Brain MRI shows Right MCA ischemic infarct  Impression:  Right MCA ischemic infarct likely due to large artery atherosclerosis, CJ stenosis.     NEURO: Continue close monitoring for neurologic deterioration, permissive HTN, titrate statin to LDL goal less than 70, MRI Brain w/o, MRA Head w/o and Neck w/contrast noted above. Plan for possible Right CEA, Dr Fong (vascular sx) eval appreciated. Physical therapy/OT recommends home with outpatient services.  I spoke with vascular surgery; patient will need cardiac clearance before CEA.    ANTITHROMBOTIC THERAPY: ASA/Plavix for secondary stroke prevention in the setting of carotis stenosis    PULMONARY: CXR clear (09/13), protecting airway, saturating well     CARDIOVASCULAR: TTE shows moderate mitral regurgitation, Severely dilated left atrium,  left ventricular remodeling, diastolic LV dysfunction, EF 70-75%, cardiac monitoring no events. Pt evaluated by Dr Navas (cardiology), plan for ALISON to r/o LA thrombus, and  LOOP to monitor for occult arrhythmias once stable after CEA, will decreased lopressor to 25mg BID                           SBP goal: normotension    GASTROINTESTINAL:  Ulcer prophylaxis, dysphagia screen- passed, tolerating diet. NPO  after midnight for possible CEA      Diet: Regular DASH    RENAL: Good urine output      Na Goal: Greater than 135     Silvestre: N    HEMATOLOGY: No signs, symptoms of active bleeding     DVT ppx:  LMWH      ID: afebrile, no signs, symptoms of infection    OTHER: Family at bedside, all questions, plan/goals of care and concerns addressed.    DISPOSITION: Home once stable and workup is complete      CORE MEASURES:        Admission NIHSS:      TPA: NO      LDL/HDL: 53/30     Depression Screen: 0     Statin Therapy:     Dysphagia Screen: PASS     Smoking  NO      Afib NO     Stroke Education YES

## 2018-09-16 NOTE — PROGRESS NOTE ADULT - ATTENDING COMMENTS
Mr. Tejeda is a 74 yo M with a PMH of CAD, GERD, HLD, HTN and Left Sciatic (5 years ago) who is presenting with left foot drop for the past 2 weeks w/o progression or improvement, sent in by his orthopod for an MRI L-spine. Neurological exam is significant for 3/5 dorsiflexion of left foot.  MRI brain shows right JOSE LUIS territory cortical infarction.  Impression: Acute right frontal cortical infarction, JOSE LUIS territory.  Continue stroke workup including echocardiogram, hemoglobin A1c lipid panel and loop recorder to rule out possible cardioembolic event.  MRI shows multiple small previous silent lacunar infarcts.  Aggressiveness factor control.
I also spoke with the family and patient in great detail regarding CEA. there can be multiple mechanisms of a new small ischemic infarct and therefore His cardioembolic workup will follow after CEA is completed.
Pending MRI results, PT/OT eval to determine plan and disposition.

## 2018-09-17 RX ADMIN — Medication 0.5 MILLIGRAM(S): at 22:43

## 2018-09-17 RX ADMIN — PANTOPRAZOLE SODIUM 40 MILLIGRAM(S): 20 TABLET, DELAYED RELEASE ORAL at 06:44

## 2018-09-17 RX ADMIN — Medication 12.5 MILLIGRAM(S): at 11:13

## 2018-09-17 RX ADMIN — ATORVASTATIN CALCIUM 40 MILLIGRAM(S): 80 TABLET, FILM COATED ORAL at 21:10

## 2018-09-17 RX ADMIN — CLOPIDOGREL BISULFATE 75 MILLIGRAM(S): 75 TABLET, FILM COATED ORAL at 14:31

## 2018-09-17 RX ADMIN — Medication 25 MILLIGRAM(S): at 17:06

## 2018-09-17 RX ADMIN — ENOXAPARIN SODIUM 40 MILLIGRAM(S): 100 INJECTION SUBCUTANEOUS at 21:10

## 2018-09-17 RX ADMIN — LISINOPRIL 40 MILLIGRAM(S): 2.5 TABLET ORAL at 11:13

## 2018-09-17 RX ADMIN — Medication 81 MILLIGRAM(S): at 14:30

## 2018-09-17 RX ADMIN — Medication 25 MILLIGRAM(S): at 06:44

## 2018-09-17 NOTE — PROGRESS NOTE ADULT - ASSESSMENT
Patient is a 73 year old male w/PMH of CAD, GERD, HLD, HTN and Left Sciatic (5 years ago) who presented with left foot drop for the past 2 weeks prior to admission. Patient reported worsening left leg weakness over two weeks , symptoms are localized to his  foot and toes,  during this time he reported recurrent falls including minor head trauma,  symptoms have been persistent not worsening. He was seen in the emergency room several days prior to admission for fall , CT head imaging at that time showed no acute findings. On day of admission, patient was evaluated by orthopedics who recommended coming to the hospital for further work up. CTA neck shows 70% narrowing of the right internal carotid artery, Brain MRI shows Right MCA ischemic infarct  Impression:  Right MCA ischemic infarct likely due to large artery atherosclerosis, CJ stenosis.     NEURO: Continue close monitoring for neurologic deterioration, permissive HTN, titrate statin to LDL goal less than 70, MRI Brain w/o, MRA Head w/o and Neck w/contrast noted above. Plan for possible Right CEA, Dr Fong (vascular sx) eval appreciated. Physical therapy/OT recommends home with outpatient services.    ANTITHROMBOTIC THERAPY: ASA/Plavix for secondary stroke prevention in the setting of carotis stenosis    PULMONARY: CXR clear (09/13), protecting airway, saturating well     CARDIOVASCULAR: TTE shows moderate mitral regurgitation, Severely dilated left atrium,  left ventricular remodeling, diastolic LV dysfunction, EF 70-75%, cardiac monitoring no events. Pt evaluated by Dr Navas (cardiology), plan for ALISON to r/o LA thrombus, and  LOOP to monitor for occult arrhythmias, plan for stress test on Tuesday as per patient's outpatient cardiologist Dr. Thierno Ferrer recommendations, prior to CEA with Krishnasastry.                           SBP goal: normotension    GASTROINTESTINAL:  Ulcer prophylaxis, dysphagia screen- passed, tolerating diet.     Diet: Regular DASH    RENAL: Good urine output      Na Goal: Greater than 135     Silvestre: N    HEMATOLOGY: No signs, symptoms of active bleeding     DVT ppx:  LMWH      ID: afebrile, no signs, symptoms of infection    OTHER: Family at bedside, all questions, plan/goals of care and concerns addressed.    DISPOSITION: Home once stable and workup is complete      CORE MEASURES:        Admission NIHSS:      TPA: NO      LDL/HDL: 53/30     Depression Screen: 0     Statin Therapy:     Dysphagia Screen: PASS     Smoking  NO      Afib NO     Stroke Education YES

## 2018-09-18 ENCOUNTER — TRANSCRIPTION ENCOUNTER (OUTPATIENT)
Age: 73
End: 2018-09-18

## 2018-09-18 VITALS
SYSTOLIC BLOOD PRESSURE: 113 MMHG | OXYGEN SATURATION: 94 % | RESPIRATION RATE: 18 BRPM | DIASTOLIC BLOOD PRESSURE: 70 MMHG | TEMPERATURE: 98 F | HEART RATE: 90 BPM

## 2018-09-18 LAB
ANION GAP SERPL CALC-SCNC: 10 MMOL/L — SIGNIFICANT CHANGE UP (ref 5–17)
ANION GAP SERPL CALC-SCNC: 13 MMOL/L — SIGNIFICANT CHANGE UP (ref 5–17)
BLD GP AB SCN SERPL QL: NEGATIVE — SIGNIFICANT CHANGE UP
BUN SERPL-MCNC: 27 MG/DL — HIGH (ref 7–23)
BUN SERPL-MCNC: 32 MG/DL — HIGH (ref 7–23)
CALCIUM SERPL-MCNC: 8.8 MG/DL — SIGNIFICANT CHANGE UP (ref 8.4–10.5)
CALCIUM SERPL-MCNC: 9.2 MG/DL — SIGNIFICANT CHANGE UP (ref 8.4–10.5)
CHLORIDE SERPL-SCNC: 98 MMOL/L — SIGNIFICANT CHANGE UP (ref 96–108)
CHLORIDE SERPL-SCNC: 98 MMOL/L — SIGNIFICANT CHANGE UP (ref 96–108)
CK MB CFR SERPL CALC: 2.4 NG/ML — SIGNIFICANT CHANGE UP (ref 0–6.7)
CK SERPL-CCNC: 73 U/L — SIGNIFICANT CHANGE UP (ref 30–200)
CO2 SERPL-SCNC: 26 MMOL/L — SIGNIFICANT CHANGE UP (ref 22–31)
CO2 SERPL-SCNC: 29 MMOL/L — SIGNIFICANT CHANGE UP (ref 22–31)
CREAT SERPL-MCNC: 1.39 MG/DL — HIGH (ref 0.5–1.3)
CREAT SERPL-MCNC: 1.75 MG/DL — HIGH (ref 0.5–1.3)
GLUCOSE SERPL-MCNC: 117 MG/DL — HIGH (ref 70–99)
GLUCOSE SERPL-MCNC: 183 MG/DL — HIGH (ref 70–99)
MAGNESIUM SERPL-MCNC: 1.8 MG/DL — SIGNIFICANT CHANGE UP (ref 1.6–2.6)
PHOSPHATE SERPL-MCNC: 3.4 MG/DL — SIGNIFICANT CHANGE UP (ref 2.5–4.5)
POTASSIUM SERPL-MCNC: 3.8 MMOL/L — SIGNIFICANT CHANGE UP (ref 3.5–5.3)
POTASSIUM SERPL-MCNC: 3.8 MMOL/L — SIGNIFICANT CHANGE UP (ref 3.5–5.3)
POTASSIUM SERPL-SCNC: 3.8 MMOL/L — SIGNIFICANT CHANGE UP (ref 3.5–5.3)
POTASSIUM SERPL-SCNC: 3.8 MMOL/L — SIGNIFICANT CHANGE UP (ref 3.5–5.3)
RH IG SCN BLD-IMP: POSITIVE — SIGNIFICANT CHANGE UP
SODIUM SERPL-SCNC: 137 MMOL/L — SIGNIFICANT CHANGE UP (ref 135–145)
SODIUM SERPL-SCNC: 137 MMOL/L — SIGNIFICANT CHANGE UP (ref 135–145)
TROPONIN T, HIGH SENSITIVITY RESULT: 23 NG/L — SIGNIFICANT CHANGE UP (ref 0–51)

## 2018-09-18 PROCEDURE — 93312 ECHO TRANSESOPHAGEAL: CPT | Mod: 26

## 2018-09-18 PROCEDURE — 93320 DOPPLER ECHO COMPLETE: CPT | Mod: 26

## 2018-09-18 PROCEDURE — 93325 DOPPLER ECHO COLOR FLOW MAPG: CPT | Mod: 26

## 2018-09-18 RX ORDER — METOPROLOL TARTRATE 50 MG
1 TABLET ORAL
Qty: 0 | Refills: 0 | DISCHARGE
Start: 2018-09-18

## 2018-09-18 RX ORDER — METOPROLOL TARTRATE 50 MG
0 TABLET ORAL
Qty: 0 | Refills: 0 | COMMUNITY

## 2018-09-18 RX ORDER — ATORVASTATIN CALCIUM 80 MG/1
1 TABLET, FILM COATED ORAL
Qty: 0 | Refills: 0 | DISCHARGE
Start: 2018-09-18

## 2018-09-18 RX ORDER — ATORVASTATIN CALCIUM 80 MG/1
1 TABLET, FILM COATED ORAL
Qty: 0 | Refills: 0 | COMMUNITY

## 2018-09-18 RX ORDER — PANTOPRAZOLE SODIUM 20 MG/1
1 TABLET, DELAYED RELEASE ORAL
Qty: 0 | Refills: 0 | COMMUNITY
Start: 2018-09-18

## 2018-09-18 RX ORDER — HYDROCHLOROTHIAZIDE 25 MG
1 TABLET ORAL
Qty: 0 | Refills: 0 | DISCHARGE
Start: 2018-09-18

## 2018-09-18 RX ORDER — QUETIAPINE FUMARATE 200 MG/1
25 TABLET, FILM COATED ORAL AT BEDTIME
Qty: 0 | Refills: 0 | Status: DISCONTINUED | OUTPATIENT
Start: 2018-09-18 | End: 2018-09-18

## 2018-09-18 RX ORDER — SODIUM CHLORIDE 9 MG/ML
1000 INJECTION INTRAMUSCULAR; INTRAVENOUS; SUBCUTANEOUS
Qty: 0 | Refills: 0 | Status: DISCONTINUED | OUTPATIENT
Start: 2018-09-18 | End: 2018-09-18

## 2018-09-18 RX ORDER — CLOPIDOGREL BISULFATE 75 MG/1
1 TABLET, FILM COATED ORAL
Qty: 30 | Refills: 0
Start: 2018-09-18 | End: 2018-10-17

## 2018-09-18 RX ORDER — CLOPIDOGREL BISULFATE 75 MG/1
1 TABLET, FILM COATED ORAL
Qty: 30 | Refills: 0 | OUTPATIENT
Start: 2018-09-18 | End: 2018-10-17

## 2018-09-18 RX ORDER — METOPROLOL TARTRATE 50 MG
5 TABLET ORAL ONCE
Qty: 0 | Refills: 0 | Status: COMPLETED | OUTPATIENT
Start: 2018-09-18 | End: 2018-09-18

## 2018-09-18 RX ORDER — PANTOPRAZOLE SODIUM 20 MG/1
40 TABLET, DELAYED RELEASE ORAL ONCE
Qty: 0 | Refills: 0 | Status: COMPLETED | OUTPATIENT
Start: 2018-09-18 | End: 2018-09-18

## 2018-09-18 RX ORDER — SODIUM CHLORIDE 9 MG/ML
500 INJECTION INTRAMUSCULAR; INTRAVENOUS; SUBCUTANEOUS ONCE
Qty: 0 | Refills: 0 | Status: COMPLETED | OUTPATIENT
Start: 2018-09-18 | End: 2018-09-18

## 2018-09-18 RX ORDER — PANTOPRAZOLE SODIUM 20 MG/1
1 TABLET, DELAYED RELEASE ORAL
Qty: 0 | Refills: 0 | COMMUNITY

## 2018-09-18 RX ADMIN — PANTOPRAZOLE SODIUM 40 MILLIGRAM(S): 20 TABLET, DELAYED RELEASE ORAL at 06:16

## 2018-09-18 RX ADMIN — LISINOPRIL 40 MILLIGRAM(S): 2.5 TABLET ORAL at 12:27

## 2018-09-18 RX ADMIN — SODIUM CHLORIDE 50 MILLILITER(S): 9 INJECTION INTRAMUSCULAR; INTRAVENOUS; SUBCUTANEOUS at 05:49

## 2018-09-18 RX ADMIN — Medication 12.5 MILLIGRAM(S): at 12:26

## 2018-09-18 RX ADMIN — QUETIAPINE FUMARATE 25 MILLIGRAM(S): 200 TABLET, FILM COATED ORAL at 00:00

## 2018-09-18 RX ADMIN — Medication 81 MILLIGRAM(S): at 12:26

## 2018-09-18 RX ADMIN — SODIUM CHLORIDE 1000 MILLILITER(S): 9 INJECTION INTRAMUSCULAR; INTRAVENOUS; SUBCUTANEOUS at 05:30

## 2018-09-18 RX ADMIN — CLOPIDOGREL BISULFATE 75 MILLIGRAM(S): 75 TABLET, FILM COATED ORAL at 12:27

## 2018-09-18 NOTE — DISCHARGE NOTE ADULT - MEDICATION SUMMARY - MEDICATIONS TO STOP TAKING
I will STOP taking the medications listed below when I get home from the hospital:    Protonix 40 mg oral delayed release tablet  -- 1 tab(s) by mouth once a day

## 2018-09-18 NOTE — DISCHARGE NOTE ADULT - PATIENT PORTAL LINK FT
You can access the [facility] Patient Portal, offered by [facility], by registering with the following website: [email or web address]

## 2018-09-18 NOTE — DISCHARGE NOTE ADULT - MEDICATION SUMMARY - MEDICATIONS TO TAKE
I will START or STAY ON the medications listed below when I get home from the hospital:    aspirin 81 mg oral tablet  -- 1 tab(s) by mouth once a day  -- Indication: For Stenosis of right carotid artery    quinapril 40 mg oral tablet  -- 1 tab(s) by mouth once a day  -- Indication: For HTN (hypertension)    atorvastatin 40 mg oral tablet  -- 1 tab(s) by mouth once a day (at bedtime)  -- Indication: For CAD (coronary artery disease)    clopidogrel 75 mg oral tablet  -- 1 tab(s) by mouth once a day  -- Indication: For Stenosis of right carotid artery    metoprolol tartrate 25 mg oral tablet  -- 1 tab(s) by mouth 2 times a day  -- Indication: For CAD (coronary artery disease)    hydroCHLOROthiazide 12.5 mg oral capsule  -- 1 cap(s) by mouth once a day  -- Indication: For HTN (hypertension)

## 2018-09-18 NOTE — DISCHARGE NOTE ADULT - MEDICATION SUMMARY - MEDICATIONS TO CHANGE
I will SWITCH the dose or number of times a day I take the medications listed below when I get home from the hospital:    Lopressor 50 mg oral tablet

## 2018-09-18 NOTE — DISCHARGE NOTE ADULT - CARE PROVIDERS DIRECT ADDRESSES
,DirectAddress_Unknown,[email or web address],DirectAddress_Unknown

## 2018-09-18 NOTE — PROGRESS NOTE ADULT - REASON FOR ADMISSION
left lower extremity weakness x two weeks

## 2018-09-18 NOTE — DISCHARGE NOTE ADULT - CARE PLAN
Principal Discharge DX:	Stenosis of right carotid artery  Goal:	Surgical removal  Assessment and plan of treatment:	You were found to have stenosis of your right carotid artery. You were also found to have strokes on the right side of your brain. We believe your strokes are from the narrowed right carotid artery. We called our vascular surgeons, and they recommended that you undergo surgery to clear your right carotid artery. You are scheduled to undergo surgery on 9/21/18 with Dr. Bhagat  Secondary Diagnosis:	Stroke  Goal:	Diagnosis  Assessment and plan of treatment:	We did an MRI of your brain and found that you had strokes. You are to take aspirin and plavix as we have directed.  Secondary Diagnosis:	Left atrial dilatation  Goal:	Follow up with cardiology  Assessment and plan of treatment:	You were found to have a severely dilated left atrium in the heart. This puts you at risk for atrial fibrillation, and therefore blood clots in the heart, which can go to the brain to cause strokes. You will therefore need a loop recorder placed as outpatient. Please follow up with Dr. Yosef Livingston, the cardiologist that saw you in the hosptial. Principal Discharge DX:	Stenosis of right carotid artery  Goal:	Surgical removal  Assessment and plan of treatment:	You were found to have stenosis of your right carotid artery. You were also found to have strokes on the right side of your brain. We believe your strokes are from the narrowed right carotid artery. We called our vascular surgeons, and they recommended that you undergo surgery to clear your right carotid artery. You are scheduled to undergo surgery on 9/21/18 with Dr. Bhagat  Secondary Diagnosis:	Stroke  Goal:	Diagnosis  Assessment and plan of treatment:	We did an MRI of your brain and found that you had strokes. You are to take aspirin and plavix as we have directed. You will need to stop taking pantoprazole because it interferes with the effectiveness of the plavix.  Secondary Diagnosis:	Left atrial dilatation  Goal:	Follow up with cardiology  Assessment and plan of treatment:	You were found to have a severely dilated left atrium in the heart. This puts you at risk for atrial fibrillation, and therefore blood clots in the heart, which can go to the brain to cause strokes. You will therefore need a loop recorder placed as outpatient. Please follow up with Dr. Yosef Livingston, the cardiologist that saw you in the hosptial. Principal Discharge DX:	Stenosis of right carotid artery  Goal:	Surgical removal  Assessment and plan of treatment:	You were found to have stenosis of your right carotid artery. You were also found to have strokes on the right side of your brain. We believe your strokes are from the narrowed right carotid artery. We called our vascular surgeons, Dr. Bhagat. Please follow up with Dr. Bhagat in 1-2 weeks.  Secondary Diagnosis:	Stroke  Goal:	Diagnosis  Assessment and plan of treatment:	We did an MRI of your brain and found that you had strokes. You are to take aspirin and plavix as we have directed. You will need to stop taking pantoprazole because it interferes with the effectiveness of the plavix.  Secondary Diagnosis:	Left atrial dilatation  Goal:	Follow up with cardiology  Assessment and plan of treatment:	You were found to have a severely dilated left atrium in the heart. This puts you at risk for atrial fibrillation, and therefore blood clots in the heart, which can go to the brain to cause strokes. You will therefore need a loop recorder placed as outpatient. Please follow up with Dr. Yosef Livingston, the cardiologist that saw you in the hosptial.

## 2018-09-18 NOTE — PROGRESS NOTE ADULT - ASSESSMENT
73 year old male w/PMH of CAD, GERD, HLD, HTN and Left Sciatic (5 years ago) who presented with left foot drop for the past 2 weeks. Patient reported worsening left leg weakness over two weeks prior to admission, symptoms were localized to his foot and toes, during this time he reported recurrent falls including minor head trauma,  ymptoms have been persistent not worsening. Patient reported no pain. No fever or chills , no other neurological deficits.  No urinary or bowel incontinence. He was seen in the emergency room several days prior to admission for fall , CT head imaging at that time showed no acute findings , patient was provided a leg brace and scheduled to follow up with orthopedics. On day of admission, patient was evaluated by orthopedics who recommended coming to the hospital for further work up.  Neurology consulted for the MRI head results which showed R MCA infarct. On admission NIHSS: 0.     Impression:  Right MCA ischemic infarct likely due to large artery atherosclerosis, CJ stenosis.     NEURO: Neurologically with improvement, Continue monitoring for neurologic deterioration, permissive HTN to gradual normotension, Admission LDL 53- continue on home statin, MRI Brain w/o, MRA Head w/o and Neck w/contrast noted above. Plan for possible Right CEA as outpatient after cardiac workup complete, Dr Fong (vascular sx) eval appreciated. Physical therapy/OT recommends home with outpatient services.    ANTITHROMBOTIC THERAPY: ASA/Plavix for secondary stroke prevention in the setting of carotis stenosis    PULMONARY: CXR clear (09/13), protecting airway, saturating well     CARDIOVASCULAR: TTE shows moderate mitral regurgitation, Severely dilated left atrium,  left ventricular remodeling, diastolic LV dysfunction, EF 70-75%, cardiac monitoring no events. Pt evaluated by Dr Navas (cardiology), plan for ALISON to r/o LA thrombus, and ILR to monitor for occult arrhythmias, plan for stress test as outpatient with cardiologist Dr. Thierno Ferrer, prior to CEA.                         SBP goal: normotension    GASTROINTESTINAL:  Ulcer prophylaxis, dysphagia screen- passed, tolerating diet.     Diet: Regular DASH, NPO for ALISON    RENAL: DONTAE likely from contrast induced nephropathy, continue IVF hydration, avoid nephrotoxins, Good urine output      Na Goal: Greater than 135     Silvestre: N    HEMATOLOGY: No signs, symptoms of active bleeding     DVT ppx:  LMWH      ID: afebrile, no signs, symptoms of infection    OTHER: Family at bedside, all questions, plan/goals of care and concerns previously addressed.    DISPOSITION: Home once stable and workup is complete    CORE MEASURES:        Admission NIHSS: 0     TPA: NO      LDL/HDL: 53/30     Depression Screen: 0     Statin Therapy:     Dysphagia Screen: PASS     Smoking  NO      Afib NO     Stroke Education YES

## 2018-09-18 NOTE — DISCHARGE NOTE ADULT - HOSPITAL COURSE
74 yo M with a PMH of CAD, GERD, HLD, HTN and Left Sciatic (5 years ago) who is presenting with left foot drop for the past 2 weeks w/o progression or improvement, sent in by his orthopod for an MRI L-spine. Neurological exam is significant for 3/5 dorsiflexion of left foot. MRI brain shows right JOSE LUIS territory cortical infarction. CTA showed 70% right ICA stenosis. Carotid artery dopplers done showed greater than 70% right ICA stenosis. Evaluated by vascular surgery, recommended right CEA to be done as outpatient (scheduled 9/21/18).     Patient also underwent MRI L-spine for LE weakness and found to have L4-L5 disc herniation on the left. TTE done per stroke workup which showed severely dilated LA. Evaluated by cardiology, deemed at risk for PAF, recommended ILR as outpatient. Underwent ALISON which showed no LA thrombus.     Patient stabilized and discharged home w/ neurology, vascular surgery follow up. Patient to undergo right CEA on 9/21. 74 yo M with a PMH of CAD, GERD, HLD, HTN and Left Sciatic (5 years ago) who is presenting with left foot drop for the past 2 weeks w/o progression or improvement, sent in by his orthopod for an MRI L-spine. Neurological exam is significant for 3/5 dorsiflexion of left foot. MRI brain shows right JSOE LUIS territory cortical infarction. CTA showed 70% right ICA stenosis. Carotid artery dopplers done showed greater than 70% right ICA stenosis. Evaluated by vascular surgery, recommended right CEA to be done as outpatient (scheduled 9/21/18).     Patient also underwent MRI L-spine for LE weakness and found to have L4-L5 disc herniation on the left. TTE done per stroke workup which showed severely dilated LA. Evaluated by cardiology, deemed at risk for PAF, recommended ILR as outpatient. Underwent ALISON which showed no LA thrombus.     Patient stabilized and discharged home w/ neurology, vascular surgery follow up.

## 2018-09-18 NOTE — PROGRESS NOTE ADULT - PROVIDER SPECIALTY LIST ADULT
Cardiology
Internal Medicine
Neurology
Vascular Surgery
Vascular Surgery
Cardiology
Neurology

## 2018-09-18 NOTE — DISCHARGE NOTE ADULT - PLAN OF CARE
Surgical removal You were found to have stenosis of your right carotid artery. You were also found to have strokes on the right side of your brain. We believe your strokes are from the narrowed right carotid artery. We called our vascular surgeons, and they recommended that you undergo surgery to clear your right carotid artery. You are scheduled to undergo surgery on 9/21/18 with Dr. Bhagat Diagnosis We did an MRI of your brain and found that you had strokes. You are to take aspirin and plavix as we have directed. Follow up with cardiology You were found to have a severely dilated left atrium in the heart. This puts you at risk for atrial fibrillation, and therefore blood clots in the heart, which can go to the brain to cause strokes. You will therefore need a loop recorder placed as outpatient. Please follow up with Dr. Yosef Livingston, the cardiologist that saw you in the hosptial. We did an MRI of your brain and found that you had strokes. You are to take aspirin and plavix as we have directed. You will need to stop taking pantoprazole because it interferes with the effectiveness of the plavix. You were found to have stenosis of your right carotid artery. You were also found to have strokes on the right side of your brain. We believe your strokes are from the narrowed right carotid artery. We called our vascular surgeons, Dr. Bhagat. Please follow up with Dr. Bhagat in 1-2 weeks.

## 2018-09-18 NOTE — DISCHARGE NOTE ADULT - ADDITIONAL INSTRUCTIONS
You are to take aspirin and plavix as we have directed.  You will therefore need a loop recorder placed as outpatient. Please follow up with Dr. Yosef Livingston, the cardiologist that saw you in the hosptial.  You are scheduled to undergo surgery on 9/21/18 with Dr. Bhagat for a right carotid endarterectomy. You are to take aspirin and plavix as we have directed.  You will therefore need a loop recorder placed as outpatient. Please follow up with Dr. Yosef Livingston, the cardiologist that saw you in the hosptial.  You are scheduled to undergo surgery on 9/21/18 with Dr. Bhagat for a right carotid endarterectomy.  You will need to stop taking pantoprazole because it interferes with the effectiveness of the plavix. You are to take aspirin and plavix as we have directed.  You will therefore need a loop recorder placed as outpatient. Please follow up with Dr. Yosef Livingston, the cardiologist that saw you in the hosptial.  Please follow up with Dr. Bhagat in 1 -2 weeks for a discussion regarding further management for your carotid stenosis.  You will need to stop taking pantoprazole because it interferes with the effectiveness of the plavix.

## 2018-09-18 NOTE — DISCHARGE NOTE ADULT - CARE PROVIDER_API CALL
Yann Patricio (DO), Neurology; Vascular Neurology  [address] [facility] [address]  [place] [address]  Phone: [phone]  Fax: [phone]    Rachid Bhagat (MD), Surgery; Surgical Critical Care; Vascular Surgery  [address] [facility] [facility] [address]  [address]  [place] [address]  Phone: [phone]  Fax: [phone]    Yosef Livingston (DO), Cardiovascular Disease; Internal Medicine; Nuclear Cardiology  [address]  [address]  [place] [address]  Phone: [phone]  Fax: [phone]

## 2018-09-18 NOTE — PROGRESS NOTE ADULT - SUBJECTIVE AND OBJECTIVE BOX
Patient is a 73y old  Male who presents with a chief complaint of left lower extremity weakness x two weeks (17 Sep 2018 14:13)      INTERVAL HISTORY: feels ok  	  MEDICATIONS:  hydrochlorothiazide 12.5 milliGRAM(s) Oral daily  lisinopril 40 milliGRAM(s) Oral daily  metoprolol tartrate 25 milliGRAM(s) Oral two times a day        PHYSICAL EXAM:  T(C): 37.2 (09-17-18 @ 19:22), Max: 37.2 (09-17-18 @ 19:22)  HR: 73 (09-17-18 @ 19:22) (64 - 88)  BP: 101/67 (09-17-18 @ 19:22) (101/66 - 126/68)  RR: 18 (09-17-18 @ 19:22) (18 - 18)  SpO2: 97% (09-17-18 @ 19:22) (95% - 98%)  Wt(kg): --  I&O's Summary    16 Sep 2018 07:01  -  17 Sep 2018 07:00  --------------------------------------------------------  IN: 600 mL / OUT: 0 mL / NET: 600 mL    17 Sep 2018 07:01  -  17 Sep 2018 22:38  --------------------------------------------------------  IN: 1260 mL / OUT: 400 mL / NET: 860 mL          Appearance: Normal	  HEENT:    PERRL, EOMI	  Cardiovascular:  S1 S2, No JVD  Respiratory: Lungs clear to auscultation	  Psychiatry: Alert  Gastrointestinal:  Soft, Non-tender, + BS	  Skin: No rashes, No cyanosis  Extremities:  No edema of LE        Labs personally reviewed      < from: Transthoracic Echocardiogram (09.14.18 @ 14:22) >  Conclusions:  1. Mitral annular calcification, otherwise normal mitral  valve. At least Moderate mitral regurgitation. Limited  evaluation.  2. Calcified trileaflet aortic valve with normal opening.  Mild aortic regurgitation.  3. Severely dilated left atrium.  LA volume index = 49  cc/m2.  4. Increased relative wall thickness with normal left  ventricular mass index, consistent with concentric left  ventricular remodeling.  5. Endocardium not well visualized; grossly normal left  ventricular systolic function. There is upper septal  hypertrophy  without obstruction.  6. Reversal of the E-A  waves of the mitral inflow pattern  is consistent with diastolic LV dysfunction.  7. The right ventricle is not well visualized; grossly  normal right ve    < end of copied text >        Assessment and Plan:    Assessment:  · Assessment		  73 M w/PMH of CAD, GERD, HLD, HTN and Left Sciatic (5 years ago) p/w left foot drop 2/2 CVA     Problem/Plan - 1:  ·  Problem: acute CVA  Plan: CVA Tx per neurology service, agree with ASA, statin  Given severe dilated LA on echo pt does have substrate for PAF   LAISON to rule out LA thrombus, to discuss with family and his outpt cardiologist Dr Thierno Ferrer  Will need ILR prior to discharge if no FREDDY thrombus identified  Tele monitoring now  CVA is more likely cardioembolic than 2/2 carotid Dz     Problem/Plan -   ·  Problem: CAD (coronary artery disease).  Plan: - ASA   - statin.      Problem/Plan - 3:  ·  Problem: HTN (hypertension).  Plan: - continue metoprolol  - continue ACE I   - continue HCTZ.      Problem/Plan - 4:  ·  Problem: GERD (gastroesophageal reflux disease).  Plan: - PPI.      Problem/Plan - 4:  ·  Problem: Orthopnea  Plan: much improved with Benzo           Yosef Livingston DO [facility]  Cardiovascular Medicine  [phone]
THE PATIENT WAS SEEN AND EXAMINED BY ME WITH THE HOUSESTAFF AND STROKE TEAM DURING MORNING ROUNDS.   HPI:   73 year old male w/PMH of CAD, GERD, HLD, HTN and Left Sciatic (5 years ago) who presented with left foot drop for the past 2 weeks. Patient reported worsening left leg weakness over two weeks prior to admission , symptoms are localized to his  foot and toes,  during this time he reported recurrent falls including minor head trauma,  symptoms have been persistent not worsening . Patient reported no pain. No fever or chills , no other neurological deficits.  No urinary or bowel incontinence. He was seen in the emergency room several days prior to admission for fall , CT head imaging at that time showed no acute findings , patient was provided a leg brace and scheduled to follow up with orthopedics.  On day of admission, patient was evaluated by orthopedics who recommended coming to the hospital for further work up.  Neurology consulted for the MRI head results.      SUBJECTIVE: No events overnight.  No new neurologic complaints.      aspirin enteric coated 81 milliGRAM(s) Oral daily  atorvastatin 40 milliGRAM(s) Oral at bedtime  calcium carbonate    500 mG (Tums) Chewable 2 Tablet(s) Chew daily PRN  clopidogrel Tablet 75 milliGRAM(s) Oral daily  diphenhydrAMINE   Capsule 50 milliGRAM(s) Oral at bedtime PRN  enoxaparin Injectable 40 milliGRAM(s) SubCutaneous every 24 hours  hydrochlorothiazide 12.5 milliGRAM(s) Oral daily  lisinopril 40 milliGRAM(s) Oral daily  metoprolol tartrate 50 milliGRAM(s) Oral two times a day  pantoprazole    Tablet 40 milliGRAM(s) Oral before breakfast      PHYSICAL EXAM:   Vital Signs Last 24 Hrs  T(C): 36.5 (15 Sep 2018 08:33), Max: 36.7 (14 Sep 2018 15:35)  T(F): 97.7 (15 Sep 2018 08:33), Max: 98 (14 Sep 2018 15:35)  HR: 72 (15 Sep 2018 08:33) (57 - 72)  BP: 168/92 (15 Sep 2018 08:33) (109/65 - 168/92)  BP(mean): --  RR: 18 (15 Sep 2018 08:33) (18 - 18)  SpO2: 98% (15 Sep 2018 08:33) (96% - 98%)    General: No acute distress  HEENT: EOM intact, visual fields full  Abdomen: Soft, nontender, nondistended   Extremities: No edema    NEUROLOGICAL EXAM:  Mental status: Awake, alert, oriented x3, no aphasia, no neglect, normal memory   Cranial Nerves: No facial asymmetry, no nystagmus, no dysarthria,  tongue midline  Motor exam: Normal tone, no drift, 5/5 RUE, 5/5 RLE, 5/5 LUE, 4-/5 LLE proximal, left foot drop, normal fine finger movements.  Sensation: Intact to light touch   Coordination/ Gait: No dysmetria, NORMAN intact and symmetric bilaterally      LABS:         Hemoglobin A1C, Whole Blood: 5.6 % (09-14 @ 07:57)      IMAGING: Reviewed by me.   Carotid Doppler (09/15/18) There is a severe, greater than 70% stenosis of the right internal carotid artery.  There is a flow-limiting stenosis of the right external carotid artery.There is a moderate, 50-69% stenosis of the left internal carotid artery.    CT BRAIN (09.13.18): There is no CT evidence of acute intracranial hemorrhage, extra-axial collection, edema, mass effect, central herniation, or hydrocephalus.    CTA NECK: 70% narrowing of the right internal carotid artery by NASCET criteria. No evidence of vascular dissection. Moderate, stenosis origin left internal carotid artery corresponding to 50-69% narrowing.     CTA BRAIN: No major vessel occlusion or proximal stenosis by NASCET criteria. No evidence of aneurysm or other vascular malformation.    MRI HEAD (09/13/18) : Tiny area of acute infarction involving the right posterior precentral gyrus with associated cytotoxic edema. No hemorrhagic transformation. Additional small chronic high right-sided MCA distribution infarcts. Multiple nonspecific abnormal white matter foci of T2/FLAIR prolongation statistically favoring microvascular disease.
Francisco Yates MD  Pager [phone]  Spectra [phone]  Cell Phone [phone]    Patient is a 73y old  Male who presents with a chief complaint of left lower extremity weakness x two weeks (13 Sep 2018 03:57)        SUBJECTIVE / OVERNIGHT EVENTS: Patient without complaints. Completed MRI this morning. No acute issues      MEDICATIONS  (STANDING):  aspirin enteric coated 81 milliGRAM(s) Oral daily  atorvastatin 40 milliGRAM(s) Oral at bedtime  hydrochlorothiazide 12.5 milliGRAM(s) Oral daily  lisinopril 40 milliGRAM(s) Oral daily  metoprolol tartrate 50 milliGRAM(s) Oral two times a day  pantoprazole    Tablet 40 milliGRAM(s) Oral before breakfast  sodium chloride 0.9%. 1000 milliLiter(s) (125 mL/Hr) IV Continuous <Continuous>    MEDICATIONS  (PRN):  diphenhydrAMINE   Capsule 50 milliGRAM(s) Oral at bedtime PRN Insomnia      Vital Signs Last 24 Hrs  T(C): 37 (13 Sep 2018 08:18), Max: 37.1 (12 Sep 2018 19:18)  T(F): 98.6 (13 Sep 2018 08:18), Max: 98.7 (12 Sep 2018 19:18)  HR: 75 (13 Sep 2018 08:18) (65 - 81)  BP: 151/82 (13 Sep 2018 08:18) (108/67 - 168/73)  BP(mean): --  RR: 19 (13 Sep 2018 08:18) (16 - 19)  SpO2: 96% (13 Sep 2018 08:18) (96% - 99%)  CAPILLARY BLOOD GLUCOSE        I&O's Summary        PHYSICAL EXAM  GENERAL: NAD, well-developed  HEAD:  Atraumatic, Normocephalic  EYES: EOMI, PERRLA, conjunctiva and sclera clear  CHEST/LUNG: Clear to auscultation bilaterally; No wheeze  HEART: Regular rate and rhythm; No murmurs, rubs, or gallops  ABDOMEN: Soft, Nontender, Nondistended; Bowel sounds present  EXTREMITIES:  2+ Peripheral Pulses, No clubbing, cyanosis, or edema; L foot drop  PSYCH: AAOx3      LABS:                        13.6   5.5   )-----------( 150      ( 13 Sep 2018 06:48 )             38.6     09-13    144  |  105  |  12  ----------------------------<  156<H>  3.8   |  28  |  1.11    Ca    8.9      13 Sep 2018 06:48    TPro  6.9  /  Alb  4.4  /  TBili  0.5  /  DBili  x   /  AST  21  /  ALT  21  /  AlkPhos  81  09-12    PT/INR - ( 12 Sep 2018 16:21 )   PT: 10.3 sec;   INR: 0.95 ratio         PTT - ( 12 Sep 2018 16:21 )  PTT:27.3 sec            RADIOLOGY & ADDITIONAL TESTS:    Consultant(s) Notes Reviewed: Neuro, Ortho
Patient  has  a 60 to 70  % right  ICA stenosis  If  other  cardiac  source  is  eliminated  right  CEA is  appropriate  i spoke  to Dr Ferrer  yesterday  Plan to  do ALISON first  and  I  will wait  for  Dr Ferrer  and his  team's  input  regardinag  cardiac work up  For now  he is  scheduled for  Right  CEA Friday  pending  cardiac  work up  I spoke to the  family  as well
Patient is  seen  he  has  symptoms  consistent  with  right  MCA  stroke  MRI  confirms  it  There is  still  a foot  drop  but no major  hemispheric   deficits   he is on aspirin and Plavix.  he  complaints  if  anxiety and  SOB i will speak to Dr Livingston  Patient's  CTA shows  a 70 %  ICA  stenosis  right  side  and  50 %  stenosis  left  side  I will review this  with Neuro  radiology  and  Neurology Duplex  would be helpful  to  finalise  a decision on Right  CEA  If  Dupex confirms  severe  stenosis  Right  CEA should be  performed  this  admission  I spoke to the patient's  son  today on  the phone
Patient is a 73y old  Male who presents with a chief complaint of left lower extremity weakness x two weeks (14 Sep 2018 16:13)      INTERVAL HISTORY:  feels ok  	  MEDICATIONS:  hydrochlorothiazide 12.5 milliGRAM(s) Oral daily  lisinopril 40 milliGRAM(s) Oral daily  metoprolol tartrate 50 milliGRAM(s) Oral two times a day        PHYSICAL EXAM:  T(C): 36.7 (09-14-18 @ 15:35), Max: 37 (09-13-18 @ 23:21)  HR: 72 (09-14-18 @ 15:35) (54 - 72)  BP: 154/81 (09-14-18 @ 15:35) (115/68 - 154/81)  RR: 18 (09-14-18 @ 15:35) (18 - 18)  SpO2: 97% (09-14-18 @ 15:35) (96% - 97%)  Wt(kg): --  I&O's Summary    13 Sep 2018 07:01  -  14 Sep 2018 07:00  --------------------------------------------------------  IN: 2455 mL / OUT: 0 mL / NET: 2455 mL    14 Sep 2018 07:01  -  14 Sep 2018 16:42  --------------------------------------------------------  IN: 750 mL / OUT: 0 mL / NET: 750 mL          Appearance: Normal	  HEENT:    PERRL, EOMI	  Cardiovascular:  S1 S2, No JVD  Respiratory: Lungs clear to auscultation	  Psychiatry: Alert  Gastrointestinal:  Soft, Non-tender, + BS	  Skin: No rashes, No cyanosis  Extremities:  No edema of LE                                13.6   5.5   )-----------( 150      ( 13 Sep 2018 06:48 )             38.6     09-13    144  |  105  |  12  ----------------------------<  156<H>  3.8   |  28  |  1.11    Ca    8.9      13 Sep 2018 06:48          Labs personally reviewed      Assessment and Plan:    Assessment:  · Assessment		  73 M w/PMH of CAD, GERD, HLD, HTN and Left Sciatic (5 years ago) p/w left foot drop 2/2 CVA     Problem/Plan - 1:  ·  Problem: acute CVA  Plan: CVA Tx per neurology service, agree with ASA, statin  Pt considering ILR, wants to discuss with his sons first prior to deciding and findings of carotid dopplers  Carotid dopplers pending     Problem/Plan -   ·  Problem: CAD (coronary artery disease).  Plan: - ASA   - statin.      Problem/Plan - 3:  ·  Problem: HTN (hypertension).  Plan: - continue metoprolol  - continue ACE I   - continue HCTZ.      Problem/Plan - 4:  ·  Problem: GERD (gastroesophageal reflux disease).  Plan: - PPI.         Yosef Livingston DO [facility]  Cardiovascular Medicine  [phone]
Patient is a 73y old  Male who presents with a chief complaint of left lower extremity weakness x two weeks (15 Sep 2018 12:11)      INTERVAL HISTORY: feels ok    TELEMETRY: not on tele  	  MEDICATIONS:  hydrochlorothiazide 12.5 milliGRAM(s) Oral daily  lisinopril 40 milliGRAM(s) Oral daily  metoprolol tartrate 50 milliGRAM(s) Oral two times a day        PHYSICAL EXAM:  T(C): 36.6 (09-15-18 @ 23:08), Max: 36.6 (09-15-18 @ 23:08)  HR: 59 (09-15-18 @ 23:08) (59 - 74)  BP: 138/84 (09-15-18 @ 23:08) (109/65 - 168/92)  RR: 18 (09-15-18 @ 23:08) (18 - 18)  SpO2: 96% (09-15-18 @ 23:08) (94% - 98%)  Wt(kg): --  I&O's Summary    14 Sep 2018 07:01  -  15 Sep 2018 07:00  --------------------------------------------------------  IN: 750 mL / OUT: 0 mL / NET: 750 mL    15 Sep 2018 07:01  -  15 Sep 2018 23:21  --------------------------------------------------------  IN: 260 mL / OUT: 0 mL / NET: 260 mL          Appearance: Normal	  HEENT:    PERRL, EOMI	  Cardiovascular:  S1 S2, No JVD  Respiratory: Lungs clear to auscultation	  Psychiatry: Alert  Gastrointestinal:  Soft, Non-tender, + BS	  Skin: No rashes, No cyanosis  Extremities:  No edema of LE      Labs personally reviewed    < from: Transthoracic Echocardiogram (09.14.18 @ 14:22) >  Conclusions:  1. Mitral annular calcification, otherwise normal mitral  valve. At least Moderate mitral regurgitation. Limited  evaluation.  2. Calcified trileaflet aortic valve with normal opening.  Mild aortic regurgitation.  3. Severely dilated left atrium.  LA volume index = 49  cc/m2.  4. Increased relative wall thickness with normal left  ventricular mass index, consistent with concentric left  ventricular remodeling.  5. Endocardium not well visualized; grossly normal left  ventricular systolic function. There is upper septal  hypertrophy  without obstruction.  6. Reversal of the E-A  waves of the mitral inflow pattern  is consistent with diastolic LV dysfunction.  7. The right ventricle is not well visualized; grossly  normal right ve    < end of copied text >        Assessment and Plan:    Assessment:  · Assessment		  73 M w/PMH of CAD, GERD, HLD, HTN and Left Sciatic (5 years ago) p/w left foot drop 2/2 CVA     Problem/Plan - 1:  ·  Problem: acute CVA  Plan: CVA Tx per neurology service, agree with ASA, statin  Given severe dilated LA on echo pt does have substrate for PAF   ALISON to rule out LA thrombus, to discuss with family and his outpt cardiologist Dr Thierno Ferrer  Will need ILR prior to discharge  Tele monitoring now  Carotid dopplers, vascular surgery consult pending     Problem/Plan -   ·  Problem: CAD (coronary artery disease).  Plan: - ASA   - statin.      Problem/Plan - 3:  ·  Problem: HTN (hypertension).  Plan: - continue metoprolol  - continue ACE I   - continue HCTZ.      Problem/Plan - 4:  ·  Problem: GERD (gastroesophageal reflux disease).  Plan: - PPI.      Problem/Plan - 4:  ·  Problem: Orthopnea  Plan: Trial of Lasix 20mg IV daily        Yosef Livingston DO [facility]  Cardiovascular Medicine  [phone]
Patient is a 73y old  Male who presents with a chief complaint of left lower extremity weakness x two weeks (16 Sep 2018 13:45)      INTERVAL HISTORY: feels better  	  MEDICATIONS:  hydrochlorothiazide 12.5 milliGRAM(s) Oral daily  lisinopril 40 milliGRAM(s) Oral daily  metoprolol tartrate 25 milliGRAM(s) Oral two times a day        PHYSICAL EXAM:  T(C): 36.7 (09-16-18 @ 12:56), Max: 37.1 (09-16-18 @ 07:25)  HR: 60 (09-16-18 @ 12:56) (59 - 74)  BP: 92/61 (09-16-18 @ 12:56) (92/61 - 149/78)  RR: 18 (09-16-18 @ 12:56) (18 - 18)  SpO2: 97% (09-16-18 @ 12:56) (95% - 97%)  Wt(kg): --  I&O's Summary    15 Sep 2018 07:01  -  16 Sep 2018 07:00  --------------------------------------------------------  IN: 260 mL / OUT: 350 mL / NET: -90 mL    16 Sep 2018 07:01  -  16 Sep 2018 15:15  --------------------------------------------------------  IN: 600 mL / OUT: 0 mL / NET: 600 mL          Appearance: Normal	  HEENT:    PERRL, EOMI	  Cardiovascular:  S1 S2, No JVD  Respiratory: Lungs clear to auscultation	  Psychiatry: Alert  Gastrointestinal:  Soft, Non-tender, + BS	  Skin: No rashes, No cyanosis  Extremities:  No edema of LE        < from: Transthoracic Echocardiogram (09.14.18 @ 14:22) >  Conclusions:  1. Mitral annular calcification, otherwise normal mitral  valve. At least Moderate mitral regurgitation. Limited  evaluation.  2. Calcified trileaflet aortic valve with normal opening.  Mild aortic regurgitation.  3. Severely dilated left atrium.  LA volume index = 49  cc/m2.  4. Increased relative wall thickness with normal left  ventricular mass index, consistent with concentric left  ventricular remodeling.  5. Endocardium not well visualized; grossly normal left  ventricular systolic function. There is upper septal  hypertrophy  without obstruction.  6. Reversal of the E-A  waves of the mitral inflow pattern  is consistent with diastolic LV dysfunction.  7. The right ventricle is not well visualized; grossly  normal right ve    < end of copied text >        Assessment and Plan:    Assessment:  · Assessment		  73 M w/PMH of CAD, GERD, HLD, HTN and Left Sciatic (5 years ago) p/w left foot drop 2/2 CVA     Problem/Plan - 1:  ·  Problem: acute CVA  Plan: CVA Tx per neurology service, agree with ASA, statin  Given severe dilated LA on echo pt does have substrate for PAF   ALISON to rule out LA thrombus, to discuss with family and his outpt cardiologist Dr Thierno Ferrer  Will need ILR prior to discharge  Tele monitoring now  Carotid dopplers, vascular surgery recs pending  CVA is more likely cardioembolic than 2/2 carotid Dz     Problem/Plan -   ·  Problem: CAD (coronary artery disease).  Plan: - ASA   - statin.      Problem/Plan - 3:  ·  Problem: HTN (hypertension).  Plan: - continue metoprolol  - continue ACE I   - continue HCTZ.      Problem/Plan - 4:  ·  Problem: GERD (gastroesophageal reflux disease).  Plan: - PPI.      Problem/Plan - 4:  ·  Problem: Orthopnea  Plan: Trial of Lasix 20mg IV given orthopnea, pt also improved with Benzo overnight        Yosef Livingston DO [facility]  Cardiovascular Medicine  [phone]
Patient is a 73y old  Male who presents with a chief complaint of left lower extremity weakness x two weeks (18 Sep 2018 13:02)      INTERVAL HISTORY: feels ok  	  MEDICATIONS:  hydrochlorothiazide 12.5 milliGRAM(s) Oral daily  lisinopril 40 milliGRAM(s) Oral daily  metoprolol tartrate 25 milliGRAM(s) Oral two times a day        PHYSICAL EXAM:  T(C): 36.7 (09-18-18 @ 12:51), Max: 37.2 (09-17-18 @ 19:22)  HR: 90 (09-18-18 @ 12:51) (68 - 90)  BP: 113/70 (09-18-18 @ 12:51) (80/57 - 132/79)  RR: 18 (09-18-18 @ 12:51) (18 - 18)  SpO2: 94% (09-18-18 @ 12:51) (94% - 98%)  Wt(kg): --  I&O's Summary    17 Sep 2018 07:01  -  18 Sep 2018 07:00  --------------------------------------------------------  IN: 1260 mL / OUT: 400 mL / NET: 860 mL    18 Sep 2018 07:01  -  18 Sep 2018 14:03  --------------------------------------------------------  IN: 360 mL / OUT: 0 mL / NET: 360 mL          Appearance: Normal	  HEENT:    PERRL, EOMI	  Cardiovascular:  S1 S2, No JVD  Respiratory: Lungs clear to auscultation	  Psychiatry: Alert  Gastrointestinal:  Soft, Non-tender, + BS	  Skin: No rashes, No cyanosis  Extremities:  No edema of LE            09-18    137  |  98  |  32<H>  ----------------------------<  117<H>  3.8   |  26  |  1.75<H>    Ca    8.8      18 Sep 2018 05:34  Phos  3.4     09-18  Mg     1.8     09-18          Labs personally reviewed      < from: Transthoracic Echocardiogram (09.14.18 @ 14:22) >  Conclusions:  1. Mitral annular calcification, otherwise normal mitral  valve. At least Moderate mitral regurgitation. Limited  evaluation.  2. Calcified trileaflet aortic valve with normal opening.  Mild aortic regurgitation.  3. Severely dilated left atrium.  LA volume index = 49  cc/m2.  4. Increased relative wall thickness with normal left  ventricular mass index, consistent with concentric left  ventricular remodeling.  5. Endocardium not well visualized; grossly normal left  ventricular systolic function. There is upper septal  hypertrophy  without obstruction.  6. Reversal of the E-A  waves of the mitral inflow pattern  is consistent with diastolic LV dysfunction.  7. The right ventricle is not well visualized; grossly  normal right ve    < end of copied text >        Assessment and Plan:    Assessment:  · Assessment		  73 M w/PMH of CAD, GERD, HLD, HTN and Left Sciatic (5 years ago) p/w left foot drop 2/2 CVA     Problem/Plan - 1:  ·  Problem: acute CVA  Plan: CVA Tx per neurology service, agree with ASA, statin  Given severe dilated LA on echo pt does have substrate for PAF   ALISON to rule out LA thrombus done, results pending   Will need ILR prior to discharge if no FREDDY thrombus identified  Tele monitoring now  CVA is more likely cardioembolic than 2/2 carotid Dz     Problem/Plan -   ·  Problem: CAD (coronary artery disease).  Plan: - ASA   - statin.      Problem/Plan - 3:  ·  Problem: HTN (hypertension).  Plan: - continue metoprolol  - continue ACE I   - continue HCTZ.      Problem/Plan - 4:  ·  Problem: GERD (gastroesophageal reflux disease).  Plan: - PPI.      Problem/Plan - 4:  ·  Problem: Orthopnea  Plan: much improved with Benzo           Yosef Livingston DO [facility]  Cardiovascular Medicine  [phone]
Subjective: Interval History - No events overnight    Objective:   Vital Signs Last 24 Hrs  T(C): 37 (13 Sep 2018 08:18), Max: 37.1 (12 Sep 2018 19:18)  T(F): 98.6 (13 Sep 2018 08:18), Max: 98.7 (12 Sep 2018 19:18)  HR: 75 (13 Sep 2018 08:18) (65 - 81)  BP: 151/82 (13 Sep 2018 08:18) (108/67 - 168/73)  BP(mean): --  RR: 19 (13 Sep 2018 08:18) (16 - 19)  SpO2: 96% (13 Sep 2018 08:18) (96% - 99%)    General Exam:   General appearance: No acute distress                   Mental Status: Orientated to self, date and place.  Attention intact.  No dysarthria. Speech fluent.  Cranial Nerves:   PERRL, EOMI, VFF, no nystagmus.    CN V1-3 intact to light touch .  No facial asymmetry.  Hearing intact to finger rub bilaterally.  Tongue, uvula and palate midline.  Sternocleidomastoid and Trapezius intact bilaterally.    Motor:   Tone: normal.                  Strength: L-Dorsiflexion 4-/5, L-Eversion 3/5 and L-Inversion 3/5, otherwise all other muscle groups 5/5  Straight leg test on the left elicited some shooting pain and increased numbness over the dorsum of the foot.  Pronator drift: none b/l  Dysmetria: None to finger-nose-finger b/l  No truncal ataxia.    Tremor: No resting, postural or action tremor.  No myoclonus.    Sensation: decreased in a patchy area over the dorsum of the left foot.    Deep Tendon Reflexes:              Biceps          BR        Patellar        L5      Ankle         Babinski                                         R       2+               2+        2+              1+      1+             downgoing                                         L        2+               2+        2+              3+      1+             downgoing          Other:    09-13    144  |  105  |  12  ----------------------------<  156<H>  3.8   |  28  |  1.11    Ca    8.9      13 Sep 2018 06:48    TPro  6.9  /  Alb  4.4  /  TBili  0.5  /  DBili  x   /  AST  21  /  ALT  21  /  AlkPhos  81  09-12    LIVER FUNCTIONS - ( 12 Sep 2018 16:21 )  Alb: 4.4 g/dL / Pro: 6.9 g/dL / ALK PHOS: 81 U/L / ALT: 21 U/L / AST: 21 U/L / GGT: x                                 13.6   5.5   )-----------( 150      ( 13 Sep 2018 06:48 )             38.6     Radiology    < from: MR Head No Cont (09.13.18 @ 11:45) >  IMPRESSION: Tiny area of acute infarction involving the right posterior   precentral gyrus with associated cytotoxic edema. No hemorrhagic   transformation.    < end of copied text >        MEDICATIONS  (STANDING):  aspirin enteric coated 81 milliGRAM(s) Oral daily  atorvastatin 40 milliGRAM(s) Oral at bedtime  clopidogrel Tablet 75 milliGRAM(s) Oral daily  enoxaparin Injectable 40 milliGRAM(s) SubCutaneous every 24 hours  hydrochlorothiazide 12.5 milliGRAM(s) Oral daily  lisinopril 40 milliGRAM(s) Oral daily  metoprolol tartrate 50 milliGRAM(s) Oral two times a day  pantoprazole    Tablet 40 milliGRAM(s) Oral before breakfast  sodium chloride 0.9%. 1000 milliLiter(s) (125 mL/Hr) IV Continuous <Continuous>    MEDICATIONS  (PRN):  diphenhydrAMINE   Capsule 50 milliGRAM(s) Oral at bedtime PRN Insomnia
THE PATIENT WAS SEEN AND EXAMINED BY ME WITH THE HOUSESTAFF AND STROKE TEAM DURING MORNING ROUNDS.   HPI:   73 year old male w/PMH of CAD, GERD, HLD, HTN and Left Sciatic (5 years ago) who presented with left foot drop for the past 2 weeks. Patient reported worsening left leg weakness over two weeks prior to admission , symptoms are localized to his  foot and toes,  during this time he reported recurrent falls including minor head trauma,  symptoms have been persistent not worsening . Patient reported no pain. No fever or chills , no other neurological deficits.  No urinary or bowel incontinence. He was seen in the emergency room several days prior to admission for fall , CT head imaging at that time showed no acute findings , patient was provided a leg brace and scheduled to follow up with orthopedics.  On day of admission, patient was evaluated by orthopedics who recommended coming to the hospital for further work up.  Neurology consulted for the MRI head results.      SUBJECTIVE: No events overnight.  No new neurologic complaints.      aspirin enteric coated 81 milliGRAM(s) Oral daily  atorvastatin 40 milliGRAM(s) Oral at bedtime  calcium carbonate    500 mG (Tums) Chewable 2 Tablet(s) Chew daily PRN  clopidogrel Tablet 75 milliGRAM(s) Oral daily  diphenhydrAMINE   Capsule 50 milliGRAM(s) Oral at bedtime PRN  enoxaparin Injectable 40 milliGRAM(s) SubCutaneous every 24 hours  hydrochlorothiazide 12.5 milliGRAM(s) Oral daily  lisinopril 40 milliGRAM(s) Oral daily  metoprolol tartrate 50 milliGRAM(s) Oral two times a day  pantoprazole    Tablet 40 milliGRAM(s) Oral before breakfast      PHYSICAL EXAM:   Vital Signs Last 24 Hrs  T(C): 36.7 (16 Sep 2018 12:56), Max: 37.1 (16 Sep 2018 07:25)  T(F): 98 (16 Sep 2018 12:56), Max: 98.7 (16 Sep 2018 07:25)  HR: 60 (16 Sep 2018 12:56) (59 - 74)  BP: 92/61 (16 Sep 2018 12:56) (92/61 - 149/78)  BP(mean): --  RR: 18 (16 Sep 2018 12:56) (18 - 18)  SpO2: 97% (16 Sep 2018 12:56) (95% - 97%)    General: No acute distress  HEENT: EOM intact, visual fields full  Abdomen: Soft, nontender, nondistended   Extremities: No edema    NEUROLOGICAL EXAM:  Mental status: Awake, alert, oriented x3, fluent speech, no neglect, normal memory, able to follow commands   Cranial Nerves: No facial asymmetry, no nystagmus, no dysarthria,  tongue midline  Motor exam: Normal tone, no drift,  RUE 5/5, RLE 5/5, LUE 5/5  LLE  proximal 4-/5, left foot drop, normal fine finger movements.  Sensation: Intact to light touch   Coordination/ Gait: No dysmetria    LABS:       Hemoglobin A1C, Whole Blood: 5.6 % (09-14 @ 07:57)      IMAGING: Reviewed by me.     Carotid Doppler (09/15/18) There is a severe, greater than 70% stenosis of the right internal carotid artery.There is a flow-limiting stenosis of the right external carotid artery.There is a moderate, 50-69% stenosis of the left internal carotid artery.    CT BRAIN (09.13.18): There is no CT evidence of acute intracranial hemorrhage, extra-axial collection, edema, mass effect, central herniation, or hydrocephalus.    CTA NECK: 70% narrowing of the right internal carotid artery by NASCET criteria. No evidence of vascular dissection. Moderate, stenosis origin left internal carotid artery corresponding to 50-69% narrowing.     CTA BRAIN: No major vessel occlusion or proximal stenosis by NASCET criteria. No evidence of aneurysm or other vascular malformation.    MRI HEAD (09/13/18) : Tiny area of acute infarction involving the right posterior precentral gyrus with associated cytotoxic edema. No hemorrhagic transformation. Additional small chronic high right-sided MCA distribution infarcts. Multiple nonspecific abnormal white matter foci of T2/FLAIR prolongation statistically favoring microvascular disease.
THE PATIENT WAS SEEN AND EXAMINED BY ME WITH THE HOUSESTAFF AND STROKE TEAM DURING MORNING ROUNDS.   HPI:   73 year old male w/PMH of CAD, GERD, HLD, HTN and Left Sciatic (5 years ago) who presented with left foot drop for the past 2 weeks. Patient reported worsening left leg weakness over two weeks prior to admission , symptoms are localized to his  foot and toes,  during this time he reported recurrent falls including minor head trauma,  symptoms have been persistent not worsening . Patient reported no pain. No fever or chills , no other neurological deficits.  No urinary or bowel incontinence. He was seen in the emergency room several days prior to admission for fall , CT head imaging at that time showed no acute findings , patient was provided a leg brace and scheduled to follow up with orthopedics.  On day of admission, patient was evaluated by orthopedics who recommended coming to the hospital for further work up.  Neurology consulted for the MRI head results.    SUBJECTIVE: No events overnight.  No new neurologic complaints.      aspirin enteric coated 81 milliGRAM(s) Oral daily  atorvastatin 40 milliGRAM(s) Oral at bedtime  calcium carbonate    500 mG (Tums) Chewable 2 Tablet(s) Chew daily PRN  clopidogrel Tablet 75 milliGRAM(s) Oral daily  diphenhydrAMINE   Capsule 50 milliGRAM(s) Oral at bedtime PRN  enoxaparin Injectable 40 milliGRAM(s) SubCutaneous every 24 hours  hydrochlorothiazide 12.5 milliGRAM(s) Oral daily  lisinopril 40 milliGRAM(s) Oral daily  metoprolol tartrate 25 milliGRAM(s) Oral two times a day  pantoprazole    Tablet 40 milliGRAM(s) Oral before breakfast      PHYSICAL EXAM:   Vital Signs Last 24 Hrs  T(C): 36.6 (17 Sep 2018 09:15), Max: 36.6 (17 Sep 2018 09:15)  T(F): 97.9 (17 Sep 2018 09:15), Max: 97.9 (17 Sep 2018 09:15)  HR: 88 (17 Sep 2018 09:15) (60 - 88)  BP: 126/68 (17 Sep 2018 09:15) (101/65 - 126/68)  RR: 18 (17 Sep 2018 09:15) (18 - 18)  SpO2: 98% (17 Sep 2018 09:15) (95% - 98%)    General: No acute distress  HEENT: EOM intact, visual fields full  Abdomen: Soft, nontender, nondistended   Extremities: No edema    NEUROLOGICAL EXAM:  Mental status: Awake, alert, oriented x3, fluent speech, no neglect, normal memory, able to follow commands   Cranial Nerves: No facial asymmetry, no nystagmus, no dysarthria,  tongue midline  Motor exam: Normal tone, no drift,  RUE 5/5, RLE 5/5, LUE 5/5  LLE  proximal 4-/5, left foot drop, normal fine finger movements.  Sensation: Intact to light touch   Coordination/ Gait: No dysmetria    LABS:         Hemoglobin A1C, Whole Blood: 5.6 % (09-14 @ 07:57)      IMAGING: Reviewed by me.     Carotid Doppler (09/15/18) There is a severe, greater than 70% stenosis of the right internal carotid artery.There is a flow-limiting stenosis of the right external carotid artery.There is a moderate, 50-69% stenosis of the left internal carotid artery.    CT BRAIN (09.13.18): There is no CT evidence of acute intracranial hemorrhage, extra-axial collection, edema, mass effect, central herniation, or hydrocephalus.    CTA NECK: 70% narrowing of the right internal carotid artery by NASCET criteria. No evidence of vascular dissection. Moderate, stenosis origin left internal carotid artery corresponding to 50-69% narrowing.     CTA BRAIN: No major vessel occlusion or proximal stenosis by NASCET criteria. No evidence of aneurysm or other vascular malformation.    MRI HEAD (09/13/18) : Tiny area of acute infarction involving the right posterior precentral gyrus with associated cytotoxic edema. No hemorrhagic transformation. Additional small chronic high right-sided MCA distribution infarcts. Multiple nonspecific abnormal white matter foci of T2/FLAIR prolongation statistically favoring microvascular disease.
THE PATIENT WAS SEEN AND EXAMINED BY ME WITH THE HOUSESTAFF AND STROKE TEAM DURING MORNING ROUNDS.   HPI:   73 year old male w/PMH of CAD, GERD, HLD, HTN and Left Sciatic (5 years ago) who presented with left foot drop for the past 2 weeks. Patient reported worsening left leg weakness over two weeks prior to admission, symptoms were localized to his foot and toes, during this time he reported recurrent falls including minor head trauma,  ymptoms have been persistent not worsening. Patient reported no pain. No fever or chills , no other neurological deficits.  No urinary or bowel incontinence. He was seen in the emergency room several days prior to admission for fall , CT head imaging at that time showed no acute findings , patient was provided a leg brace and scheduled to follow up with orthopedics. On day of admission, patient was evaluated by orthopedics who recommended coming to the hospital for further work up.  Neurology consulted for the MRI head results. On admission NIHSS: 0.     SUBJECTIVE: No events overnight.  No new neurologic complaints.      aspirin enteric coated 81 milliGRAM(s) Oral daily  atorvastatin 40 milliGRAM(s) Oral at bedtime  calcium carbonate    500 mG (Tums) Chewable 2 Tablet(s) Chew daily PRN  clopidogrel Tablet 75 milliGRAM(s) Oral daily  diphenhydrAMINE   Capsule 50 milliGRAM(s) Oral at bedtime PRN  enoxaparin Injectable 40 milliGRAM(s) SubCutaneous every 24 hours  hydrochlorothiazide 12.5 milliGRAM(s) Oral daily  lisinopril 40 milliGRAM(s) Oral daily  metoprolol tartrate 25 milliGRAM(s) Oral two times a day  pantoprazole    Tablet 40 milliGRAM(s) Oral before breakfast  QUEtiapine 25 milliGRAM(s) Oral at bedtime  sodium chloride 0.9%. 1000 milliLiter(s) IV Continuous <Continuous>    PHYSICAL EXAM:   Vital Signs Last 24 Hrs  T(C): 36.7 (18 Sep 2018 12:51), Max: 37.2 (17 Sep 2018 19:22)  T(F): 98.1 (18 Sep 2018 12:51), Max: 99 (17 Sep 2018 19:22)  HR: 90 (18 Sep 2018 12:51) (68 - 90)  BP: 113/70 (18 Sep 2018 12:51) (80/57 - 132/79)  RR: 18 (18 Sep 2018 12:51) (18 - 18)  SpO2: 94% (18 Sep 2018 12:51) (94% - 98%)    General: No acute distress  HEENT: EOM intact, visual fields full  Abdomen: Soft, nontender, nondistended   Extremities: No edema    NEUROLOGICAL EXAM:  Mental status: Awake, alert, oriented x3, fluent speech, no neglect, normal memory, able to follow commands   Cranial Nerves: No facial asymmetry, no nystagmus, no dysarthria,  tongue midline  Motor exam: Normal tone, no drift,  RUE 5/5, RLE 5/5, LUE 5/5 LLE proximal 4-/5, left foot drop.  Sensation: Intact to light touch   Coordination/ Gait: No dysmetria    LABS:   09-18    137  |  98  |  32<H>  ----------------------------<  117<H>  3.8   |  26  |  1.75<H>    Ca    8.8      18 Sep 2018 05:34  Phos  3.4     09-18  Mg     1.8     09-18    Hemoglobin A1C, Whole Blood: 5.6 % (09-14 @ 07:57)    IMAGING: Reviewed by me.     Carotid Doppler (09/15/18) There is a severe, greater than 70% stenosis of the right internal carotid artery.There is a flow-limiting stenosis of the right external carotid artery.There is a moderate, 50-69% stenosis of the left internal carotid artery.    CT BRAIN (09.13.18): There is no CT evidence of acute intracranial hemorrhage, extra-axial collection, edema, mass effect, central herniation, or hydrocephalus.    CTA NECK: 70% narrowing of the right internal carotid artery by NASCET criteria. No evidence of vascular dissection. Moderate, stenosis origin left internal carotid artery corresponding to 50-69% narrowing.     CTA BRAIN: No major vessel occlusion or proximal stenosis by NASCET criteria. No evidence of aneurysm or other vascular malformation.    MRI HEAD (09/13/18) : Tiny area of acute infarction involving the right posterior precentral gyrus with associated cytotoxic edema. No hemorrhagic transformation. Additional small chronic high right-sided MCA distribution infarcts. Multiple nonspecific abnormal white matter foci of T2/FLAIR prolongation statistically favoring microvascular disease.
THE PATIENT WAS SEEN AND EXAMINED BY ME WITH THE HOUSESTAFF AND STROKE TEAM DURING MORNING ROUNDS.  HPI: Patient is a 73 year old male w/PMH of CAD, GERD, HLD, HTN and Left Sciatic (5 years ago) who presented with left foot drop for the past 2 weeks.Patient reported worsening left leg weakness over two weeks , symptoms are localized to his  foot and toes,  during this time he reported recurrent falls including minor head trauma,  symptoms have been persistent not worsening . Patient reported no pain. No fever or chills , no other neurological deficits.  No urinary or bowel incontinence. He was seen in the emergency room several days prior to admission for fall , CT head imaging at that time showed no acute findings , patient was provided a leg brace and scheduled to follow up with orthopedics.  On day of admission, patient was evaluated by orthopedics who recommended coming to the hospital for further work up.  Neurology consulted for the MRI head results.     SUBJECTIVE: No events overnight.  No new neurologic complaints.      aspirin enteric coated 81 milliGRAM(s) Oral daily  atorvastatin 40 milliGRAM(s) Oral at bedtime  clopidogrel Tablet 75 milliGRAM(s) Oral daily  diphenhydrAMINE   Capsule 50 milliGRAM(s) Oral at bedtime PRN  enoxaparin Injectable 40 milliGRAM(s) SubCutaneous every 24 hours  hydrochlorothiazide 12.5 milliGRAM(s) Oral daily  lisinopril 40 milliGRAM(s) Oral daily  metoprolol tartrate 50 milliGRAM(s) Oral two times a day  pantoprazole    Tablet 40 milliGRAM(s) Oral before breakfast  sodium chloride 0.9%. 1000 milliLiter(s) IV Continuous <Continuous>      PHYSICAL EXAM:   Vital Signs Last 24 Hrs  T(C): 36.7 (14 Sep 2018 15:35), Max: 37 (13 Sep 2018 23:21)  T(F): 98 (14 Sep 2018 15:35), Max: 98.6 (13 Sep 2018 23:21)  HR: 72 (14 Sep 2018 15:35) (54 - 72)  BP: 154/81 (14 Sep 2018 15:35) (115/68 - 154/81)  RR: 18 (14 Sep 2018 15:35) (18 - 18)  SpO2: 97% (14 Sep 2018 15:35) (96% - 97%)    General: No acute distress  HEENT: EOM intact, visual fields full  Abdomen: Soft, nontender, nondistended   Extremities: No edema    NEUROLOGICAL EXAM:  Mental status: Awake, alert, oriented x3, no aphasia, no neglect, normal memory   Cranial Nerves: No facial asymmetry, no nystagmus, no dysarthria,  tongue midline  Motor exam: Normal tone, no drift, 5/5 RUE, 5/5 RLE, 5/5 LUE, 4-/5 LLE proximal, left foot drop, normal fine finger movements.  Sensation: Intact to light touch   Coordination/ Gait: No dysmetria, NORMAN intact and symmetric bilaterally    LABS:                        13.6   5.5   )-----------( 150      ( 13 Sep 2018 06:48 )             38.6    09-13    144  |  105  |  12  ----------------------------<  156<H>  3.8   |  28  |  1.11    Ca    8.9      13 Sep 2018 06:48      Hemoglobin A1C, Whole Blood: 5.6 % (09-14 @ 07:57)      IMAGING: Reviewed by me.   (09.13.18)  CT BRAIN: There is no CT evidence of acute intracranial hemorrhage,   extra-axial collection, edema, mass effect, central herniation, or   hydrocephalus.    CTA NECK: 70% narrowing of the right internal carotid artery   by NASCET criteria. No evidence of vascular dissection. Moderate,   stenosis origin left internal carotid artery corresponding to 50-69%   narrowing.     CTA BRAIN: No major vessel occlusion or proximal stenosis by   NASCET criteria. No evidence of aneurysm or other vascular malformation.    MRI HEAD: Tiny area of acute infarction involving the right posterior precentral gyrus with associated cytotoxic edema. No hemorrhagic   transformation.Additional small chronic high right-sided MCA distribution infarcts.

## 2018-09-28 ENCOUNTER — APPOINTMENT (OUTPATIENT)
Dept: ELECTROPHYSIOLOGY | Facility: CLINIC | Age: 73
End: 2018-09-28

## 2018-10-25 PROCEDURE — 84100 ASSAY OF PHOSPHORUS: CPT

## 2018-10-25 PROCEDURE — 93325 DOPPLER ECHO COLOR FLOW MAPG: CPT

## 2018-10-25 PROCEDURE — 71045 X-RAY EXAM CHEST 1 VIEW: CPT

## 2018-10-25 PROCEDURE — 85730 THROMBOPLASTIN TIME PARTIAL: CPT

## 2018-10-25 PROCEDURE — 83036 HEMOGLOBIN GLYCOSYLATED A1C: CPT

## 2018-10-25 PROCEDURE — 93306 TTE W/DOPPLER COMPLETE: CPT

## 2018-10-25 PROCEDURE — 84484 ASSAY OF TROPONIN QUANT: CPT

## 2018-10-25 PROCEDURE — 86900 BLOOD TYPING SEROLOGIC ABO: CPT

## 2018-10-25 PROCEDURE — 80053 COMPREHEN METABOLIC PANEL: CPT

## 2018-10-25 PROCEDURE — 80048 BASIC METABOLIC PNL TOTAL CA: CPT

## 2018-10-25 PROCEDURE — 70496 CT ANGIOGRAPHY HEAD: CPT

## 2018-10-25 PROCEDURE — 85610 PROTHROMBIN TIME: CPT

## 2018-10-25 PROCEDURE — 93880 EXTRACRANIAL BILAT STUDY: CPT

## 2018-10-25 PROCEDURE — 93320 DOPPLER ECHO COMPLETE: CPT

## 2018-10-25 PROCEDURE — 82553 CREATINE MB FRACTION: CPT

## 2018-10-25 PROCEDURE — 93312 ECHO TRANSESOPHAGEAL: CPT

## 2018-10-25 PROCEDURE — 85027 COMPLETE CBC AUTOMATED: CPT

## 2018-10-25 PROCEDURE — 86850 RBC ANTIBODY SCREEN: CPT

## 2018-10-25 PROCEDURE — 72148 MRI LUMBAR SPINE W/O DYE: CPT

## 2018-10-25 PROCEDURE — 70551 MRI BRAIN STEM W/O DYE: CPT

## 2018-10-25 PROCEDURE — 82550 ASSAY OF CK (CPK): CPT

## 2018-10-25 PROCEDURE — 97165 OT EVAL LOW COMPLEX 30 MIN: CPT

## 2018-10-25 PROCEDURE — 83735 ASSAY OF MAGNESIUM: CPT

## 2018-10-25 PROCEDURE — 97161 PT EVAL LOW COMPLEX 20 MIN: CPT | Mod: CI

## 2018-10-25 PROCEDURE — 93005 ELECTROCARDIOGRAM TRACING: CPT

## 2018-10-25 PROCEDURE — 70498 CT ANGIOGRAPHY NECK: CPT

## 2018-10-25 PROCEDURE — 86901 BLOOD TYPING SEROLOGIC RH(D): CPT

## 2018-10-25 PROCEDURE — 99285 EMERGENCY DEPT VISIT HI MDM: CPT

## 2018-10-25 PROCEDURE — 80061 LIPID PANEL: CPT

## 2019-12-25 NOTE — DISCHARGE NOTE ADULT - NSTOBACCONEVERSMOKERY/N_GEN_A
Pt to ED with c/o dizziness, fatigue and memory problems. Brother states that when she has had these symptoms in the past, she was diagnosed with a UTI.
No

## 2020-01-01 NOTE — ED PROVIDER NOTE - MEDICAL DECISION MAKING DETAILS
Zuleyka: patient with left foot drop presents with two falls.  no n/v. no blurry vision. will get ct head/c-spine. left foot drop has been gradual and for the past two weeks. no headache, no ue weakness, no focal deficits.
no

## 2020-12-18 NOTE — PROGRESS NOTE ADULT - PROBLEM/PLAN-5
See Provation Note In Chart    Susan Leslie MD  [facility]  Office Phone # [phone]       DISPLAY PLAN FREE TEXT

## 2021-01-01 NOTE — DISCHARGE NOTE ADULT - REASON FOR ADMISSION
SW/CM Pediatric Initial Plan of Care Note     Baseline Assessment  Per chart review, pt is a 6-month-old male with currently admitted with fever.     Pt lives with his mother, father, and 7 siblings (14, 13, 11, 8, 7, 5). Mtr is a stay-at-home mom and dad works full-time as a .     SW spoke with pt's mother, Bj Child by phone. Mtr was in room with patient.  Explained SW/CM role, reason for involvement, support available from hospital staff.    Services Prior to Hospitalization:  None      Insurance  Primary: [employer]      Barriers to Discharge  Identified Barriers to Discharge/Transition Planning: Pt has no PCP.       Plan  Discharge home with no needs other than referrals for a new PCP.       Reinforced SW/CM role to provide support to families and help assist with discharge planning for patient when ready for hospital discharge. Encouraged parents/family to call SW/CM with further questions or concerns. Will continue to follow and support family during patient stay.    Refer to SW/CM Flowsheet for Goals and objective data.     Tamika Rebolledo LCSW   left lower extremity weakness x two weeks

## 2022-12-15 ENCOUNTER — APPOINTMENT (OUTPATIENT)
Dept: INTERNAL MEDICINE | Facility: CLINIC | Age: 77
End: 2022-12-15
Payer: MEDICARE

## 2022-12-15 ENCOUNTER — LABORATORY RESULT (OUTPATIENT)
Age: 77
End: 2022-12-15

## 2022-12-15 VITALS
RESPIRATION RATE: 12 BRPM | SYSTOLIC BLOOD PRESSURE: 172 MMHG | TEMPERATURE: 96.9 F | HEART RATE: 63 BPM | WEIGHT: 214 LBS | HEIGHT: 67 IN | BODY MASS INDEX: 33.59 KG/M2 | DIASTOLIC BLOOD PRESSURE: 97 MMHG | OXYGEN SATURATION: 96 %

## 2022-12-15 VITALS — DIASTOLIC BLOOD PRESSURE: 76 MMHG | SYSTOLIC BLOOD PRESSURE: 140 MMHG

## 2022-12-15 DIAGNOSIS — Z23 ENCOUNTER FOR IMMUNIZATION: ICD-10-CM

## 2022-12-15 PROCEDURE — 99387 INIT PM E/M NEW PAT 65+ YRS: CPT

## 2022-12-15 PROCEDURE — G0439: CPT

## 2022-12-20 ENCOUNTER — APPOINTMENT (OUTPATIENT)
Dept: CARDIOLOGY | Facility: CLINIC | Age: 77
End: 2022-12-20

## 2022-12-20 ENCOUNTER — APPOINTMENT (OUTPATIENT)
Dept: CARDIOLOGY | Facility: CLINIC | Age: 77
End: 2022-12-20
Payer: MEDICARE

## 2022-12-20 PROCEDURE — 93880 EXTRACRANIAL BILAT STUDY: CPT

## 2023-01-02 NOTE — HEALTH RISK ASSESSMENT
[Never] : Never [No] : No [No falls in past year] : Patient reported no falls in the past year [0] : 2) Feeling down, depressed, or hopeless: Not at all (0) [] :  [# Of Children ___] : has [unfilled] children [Fully functional (bathing, dressing, toileting, transferring, walking, feeding)] : Fully functional (bathing, dressing, toileting, transferring, walking, feeding) [Fully functional (using the telephone, shopping, preparing meals, housekeeping, doing laundry, using] : Fully functional and needs no help or supervision to perform IADLs (using the telephone, shopping, preparing meals, housekeeping, doing laundry, using transportation, managing medications and managing finances) [Smoke Detector] : smoke detector [Carbon Monoxide Detector] : carbon monoxide detector [Seat Belt] :  uses seat belt [Reports changes in hearing] : Reports no changes in hearing [Reports changes in vision] : Reports no changes in vision [Reports changes in dental health] : Reports no changes in dental health [Guns at Home] : no guns at home [Travel to Developing Areas] : does not  travel to developing areas [TB Exposure] : is not being exposed to tuberculosis [ColonoscopyDate] : about 2 years ago [de-identified] : with wife

## 2023-01-02 NOTE — COUNSELING
[Fall prevention counseling provided] : Fall prevention counseling provided [Adequate lighting] : Adequate lighting [No throw rugs] : No throw rugs [Use proper foot wear] : Use proper foot wear [Benefits of weight loss discussed] : Benefits of weight loss discussed [Encouraged to increase physical activity] : Encouraged to increase physical activity [None] : None

## 2023-01-02 NOTE — HISTORY OF PRESENT ILLNESS
[de-identified] : \par Mr. CRUZ MASSEY is a 77 year old male, accompanied by his wife,  presents for initial evaluation, annual physical\par He is doing well. Follows with cardiology ( Dr Ferrer) \par Denies SOB, chest pain, abdominal pain, N/V/D, leg swelling, headache, dizziness \par Offers no complaints\par

## 2023-01-02 NOTE — ASSESSMENT
[FreeTextEntry1] : \par \par Physical\par UTD with colonoscopy\par blood work ordered\par \par Hx of HTN\par cont current meds\par follows with cardiology\par \par Pt reports hx of Carotid artery stenosis\par on Plavix\par Says he had seen Vascular specialist in the past and was told he should have surgery done but he never followed up\par referred for carotid Duplex\par \par follow up in one week for lab results\par

## 2023-01-18 ENCOUNTER — APPOINTMENT (OUTPATIENT)
Dept: INTERNAL MEDICINE | Facility: CLINIC | Age: 78
End: 2023-01-18
Payer: MEDICARE

## 2023-01-18 VITALS
OXYGEN SATURATION: 96 % | BODY MASS INDEX: 32.49 KG/M2 | HEART RATE: 70 BPM | RESPIRATION RATE: 12 BRPM | TEMPERATURE: 97.9 F | HEIGHT: 67 IN | DIASTOLIC BLOOD PRESSURE: 75 MMHG | SYSTOLIC BLOOD PRESSURE: 129 MMHG | WEIGHT: 207 LBS

## 2023-01-18 PROCEDURE — 99214 OFFICE O/P EST MOD 30 MIN: CPT

## 2023-01-30 ENCOUNTER — APPOINTMENT (OUTPATIENT)
Dept: CARDIOLOGY | Facility: CLINIC | Age: 78
End: 2023-01-30

## 2023-01-30 LAB
25(OH)D3 SERPL-MCNC: 40.4 NG/ML
ALBUMIN SERPL ELPH-MCNC: 4.4 G/DL
ALP BLD-CCNC: 71 U/L
ALT SERPL-CCNC: 18 U/L
ANION GAP SERPL CALC-SCNC: 13 MMOL/L
APPEARANCE: CLEAR
AST SERPL-CCNC: 25 U/L
BASOPHILS # BLD AUTO: 0.03 K/UL
BASOPHILS NFR BLD AUTO: 0.5 %
BILIRUB SERPL-MCNC: 0.7 MG/DL
BILIRUBIN URINE: NEGATIVE
BLOOD URINE: ABNORMAL
BUN SERPL-MCNC: 15 MG/DL
CALCIUM SERPL-MCNC: 9.9 MG/DL
CHLORIDE SERPL-SCNC: 104 MMOL/L
CHOLEST SERPL-MCNC: 114 MG/DL
CO2 SERPL-SCNC: 25 MMOL/L
COLOR: NORMAL
CREAT SERPL-MCNC: 0.99 MG/DL
EGFR: 77 ML/MIN/1.73M2
EOSINOPHIL # BLD AUTO: 0.07 K/UL
EOSINOPHIL NFR BLD AUTO: 1.1 %
ESTIMATED AVERAGE GLUCOSE: 114 MG/DL
GLUCOSE QUALITATIVE U: NEGATIVE
GLUCOSE SERPL-MCNC: 73 MG/DL
HBA1C MFR BLD HPLC: 5.6 %
HCT VFR BLD CALC: 47.1 %
HDLC SERPL-MCNC: 43 MG/DL
HGB BLD-MCNC: 15.3 G/DL
IMM GRANULOCYTES NFR BLD AUTO: 0.3 %
KETONES URINE: NEGATIVE
LDLC SERPL CALC-MCNC: 58 MG/DL
LEUKOCYTE ESTERASE URINE: NEGATIVE
LYMPHOCYTES # BLD AUTO: 1.84 K/UL
LYMPHOCYTES NFR BLD AUTO: 28.1 %
MAN DIFF?: NORMAL
MCHC RBC-ENTMCNC: 31.7 PG
MCHC RBC-ENTMCNC: 32.5 GM/DL
MCV RBC AUTO: 97.5 FL
MONOCYTES # BLD AUTO: 0.65 K/UL
MONOCYTES NFR BLD AUTO: 9.9 %
NEUTROPHILS # BLD AUTO: 3.93 K/UL
NEUTROPHILS NFR BLD AUTO: 60.1 %
NITRITE URINE: NEGATIVE
NONHDLC SERPL-MCNC: 71 MG/DL
PH URINE: 5.5
PLATELET # BLD AUTO: 179 K/UL
POTASSIUM SERPL-SCNC: 4.6 MMOL/L
PROT SERPL-MCNC: 7 G/DL
PROTEIN URINE: NORMAL
PSA SERPL-MCNC: 1.54 NG/ML
RBC # BLD: 4.83 M/UL
RBC # FLD: 13.2 %
SODIUM SERPL-SCNC: 142 MMOL/L
SPECIFIC GRAVITY URINE: 1.02
TRIGL SERPL-MCNC: 62 MG/DL
TSH SERPL-ACNC: 1.41 UIU/ML
UROBILINOGEN URINE: NORMAL
VIT B12 SERPL-MCNC: 282 PG/ML
WBC # FLD AUTO: 6.54 K/UL

## 2023-01-31 NOTE — ASSESSMENT
[FreeTextEntry1] : \par Pt reports hx of Carotid artery stenosis\par on Plavix / atorvastatin\par  carotid Duplex: results reviewed with pt\par referred for Ct angio neck- pt will be making luis enrique't next week\par referred to vascular- has luis enrique't the end of the month with Dr Gonzalez\par referral for vascular surgeon Dr Swain given today ( I spoke with Dr Gonzalez and recommended Dr Swain)\par \par Hx of HTN\par cont current meds\par follows with cardiology\par \par Anxiety\par cont Lexapro 10mg daily\par \par rt low back pain, hx od lumbar stenosis, hx of epidural injection\par Percocet 5-325mg qhs prn\par referred for PT\par \par Pt has brought all meds from home\par All meds updated in chart\par

## 2023-01-31 NOTE — HISTORY OF PRESENT ILLNESS
[de-identified] : Mr. CRUZ MASSEY is a 77 year old male, accompanied by his wife, presents for a follow-up visit and results of recent imaging. \par \par Pt. has been experiencing lower back pain on the right side. Has been having a lot of pain in both of his knees. Pt. has not been using anything to relieve the pain, states he tried using Voltaren but it has not helped. Usually takes Tylenol\par Denies CP,  SOB, lightheadedness, and dizziness, N/V/D, or abdominal pain.

## 2023-02-09 ENCOUNTER — APPOINTMENT (OUTPATIENT)
Dept: VASCULAR SURGERY | Facility: CLINIC | Age: 78
End: 2023-02-09
Payer: MEDICARE

## 2023-02-09 VITALS
HEIGHT: 67 IN | TEMPERATURE: 98.1 F | HEART RATE: 55 BPM | BODY MASS INDEX: 32.96 KG/M2 | WEIGHT: 210 LBS | SYSTOLIC BLOOD PRESSURE: 156 MMHG | DIASTOLIC BLOOD PRESSURE: 84 MMHG

## 2023-02-09 VITALS — HEART RATE: 55 BPM | SYSTOLIC BLOOD PRESSURE: 152 MMHG | DIASTOLIC BLOOD PRESSURE: 80 MMHG

## 2023-02-09 PROCEDURE — 99204 OFFICE O/P NEW MOD 45 MIN: CPT

## 2023-03-03 ENCOUNTER — NON-APPOINTMENT (OUTPATIENT)
Age: 78
End: 2023-03-03

## 2023-03-06 ENCOUNTER — NON-APPOINTMENT (OUTPATIENT)
Age: 78
End: 2023-03-06

## 2023-03-13 ENCOUNTER — OUTPATIENT (OUTPATIENT)
Dept: OUTPATIENT SERVICES | Facility: HOSPITAL | Age: 78
LOS: 1 days | End: 2023-03-13

## 2023-03-13 VITALS
RESPIRATION RATE: 16 BRPM | SYSTOLIC BLOOD PRESSURE: 156 MMHG | HEART RATE: 64 BPM | HEIGHT: 68 IN | DIASTOLIC BLOOD PRESSURE: 86 MMHG | WEIGHT: 212.08 LBS | OXYGEN SATURATION: 97 % | TEMPERATURE: 98 F

## 2023-03-13 DIAGNOSIS — I77.9 DISORDER OF ARTERIES AND ARTERIOLES, UNSPECIFIED: ICD-10-CM

## 2023-03-13 LAB
ANION GAP SERPL CALC-SCNC: 10 MMOL/L — SIGNIFICANT CHANGE UP (ref 7–14)
BLD GP AB SCN SERPL QL: NEGATIVE — SIGNIFICANT CHANGE UP
BUN SERPL-MCNC: 13 MG/DL — SIGNIFICANT CHANGE UP (ref 7–23)
CALCIUM SERPL-MCNC: 9.5 MG/DL — SIGNIFICANT CHANGE UP (ref 8.4–10.5)
CHLORIDE SERPL-SCNC: 106 MMOL/L — SIGNIFICANT CHANGE UP (ref 98–107)
CO2 SERPL-SCNC: 27 MMOL/L — SIGNIFICANT CHANGE UP (ref 22–31)
CREAT SERPL-MCNC: 0.96 MG/DL — SIGNIFICANT CHANGE UP (ref 0.5–1.3)
EGFR: 80 ML/MIN/1.73M2 — SIGNIFICANT CHANGE UP
GLUCOSE SERPL-MCNC: 98 MG/DL — SIGNIFICANT CHANGE UP (ref 70–99)
HCT VFR BLD CALC: 45.9 % — SIGNIFICANT CHANGE UP (ref 39–50)
HGB BLD-MCNC: 14.9 G/DL — SIGNIFICANT CHANGE UP (ref 13–17)
MCHC RBC-ENTMCNC: 30.8 PG — SIGNIFICANT CHANGE UP (ref 27–34)
MCHC RBC-ENTMCNC: 32.5 GM/DL — SIGNIFICANT CHANGE UP (ref 32–36)
MCV RBC AUTO: 94.8 FL — SIGNIFICANT CHANGE UP (ref 80–100)
NRBC # BLD: 0 /100 WBCS — SIGNIFICANT CHANGE UP (ref 0–0)
NRBC # FLD: 0 K/UL — SIGNIFICANT CHANGE UP (ref 0–0)
PLATELET # BLD AUTO: 156 K/UL — SIGNIFICANT CHANGE UP (ref 150–400)
POTASSIUM SERPL-MCNC: 4.9 MMOL/L — SIGNIFICANT CHANGE UP (ref 3.5–5.3)
POTASSIUM SERPL-SCNC: 4.9 MMOL/L — SIGNIFICANT CHANGE UP (ref 3.5–5.3)
RBC # BLD: 4.84 M/UL — SIGNIFICANT CHANGE UP (ref 4.2–5.8)
RBC # FLD: 12.6 % — SIGNIFICANT CHANGE UP (ref 10.3–14.5)
RH IG SCN BLD-IMP: POSITIVE — SIGNIFICANT CHANGE UP
SODIUM SERPL-SCNC: 143 MMOL/L — SIGNIFICANT CHANGE UP (ref 135–145)
WBC # BLD: 6.2 K/UL — SIGNIFICANT CHANGE UP (ref 3.8–10.5)
WBC # FLD AUTO: 6.2 K/UL — SIGNIFICANT CHANGE UP (ref 3.8–10.5)

## 2023-03-13 RX ORDER — OXYCODONE AND ACETAMINOPHEN 5; 325 MG/1; MG/1
1 TABLET ORAL
Qty: 0 | Refills: 0 | DISCHARGE

## 2023-03-13 RX ORDER — SODIUM CHLORIDE 9 MG/ML
1000 INJECTION, SOLUTION INTRAVENOUS
Refills: 0 | Status: DISCONTINUED | OUTPATIENT
Start: 2023-03-21 | End: 2023-03-22

## 2023-03-13 NOTE — H&P PST ADULT - LANGUAGE ASSISTANCE NEEDED
uses his wife and speaks some english/No-Patient/Caregiver offered and refused free interpretation services.

## 2023-03-13 NOTE — H&P PST ADULT - NSICDXPASTMEDICALHX_GEN_ALL_CORE_FT
PAST MEDICAL HISTORY:  CAD (coronary artery disease)     GERD (gastroesophageal reflux disease)     HLD (hyperlipidemia)     HTN (hypertension)      PAST MEDICAL HISTORY:  CAD (coronary artery disease)     GERD (gastroesophageal reflux disease)     H/O carotid stenosis     History of TIA (transient ischemic attack)     HLD (hyperlipidemia)     HTN (hypertension)     Lumbar disc disorder

## 2023-03-13 NOTE — H&P PST ADULT - PSYCHIATRIC COMMENTS
OB Delivery Note





- Delivery


Date of Delivery: 10/14/21 (0508)


Surgeon: FREDDY LANIER


Estimated blood loss: 200cc





- Vaginal


Delivery presentation: vertex


Delivery position: OA


Intrapartum events: mult.variable deceleratio


Delivery induction: none


Delivery augmentation: pitocin


Delivery monitor: external FHT, external uterine


Route of delivery: 


Delivery placenta: spontaneous


Delivery cord: 3 umbilical vessels


Episiotomy: none


Delivery laceration: 2nd degree


Delivery repair: vicryl


Anesthesia: epidural


Delivery comments: 


 of a live 5'11 female infant over a 2nd degree vaginal laceration under 

epidural anesthesia with Apgars of 8 and 9 at 0508 on 10/14/2021.  Infant 

directly to maternal abd/chest, skin to skin contact.  Delayed cord clamping and

cutting; Cord cut by the Father of the Baby.  Spontaneous delivery of placenta 

complete and intact with [place] side presenting at 0512.  Fundus is firm and 

midline located 4 below the U.  Lochia is scant. Vaginal laceration repaired 

with 2-0 Vicryl on a Ct-1.  Placenta to pathology. 








- Infant


  ** A


Apgar at 1 minute: 8


Apgar at 5 minutes: 9


Infant Gender: Female (5'11) Argumentative and anxious at times

## 2023-03-13 NOTE — H&P PST ADULT - HISTORY OF PRESENT ILLNESS
Patient is a 73 year old male w/PMH of CAD, GERD, HLD, HTN and Left Sciatic (5 years ago) who is presenting with left foot drop for the past 2 weeks,  Patient reports worsening left leg weakness over two weeks , symptoms are localized to his  foot and toes,  during this time he reports recurrent falls including minor head trauma,  symptoms have been persistent not worsening . Patient reports no pain. No fever or chills , no other neurological deficits.  No urinary or bowel incontinence. He was seen in the emergency room several days prior to admission for fall , CT head imaging at that time showed no acute findings , patient was provided a leg brace and scheduled to follow up with orthopedics.  On day of admission, patient was evaluated by orthopedics who recommended coming to the hospital for further work up. 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 Pt is poor communicator of MH   Pt given information for COVID PCR testing sites and told to make appointment 3-5 days preop

## 2023-03-13 NOTE — H&P PST ADULT - PROBLEM SELECTOR PLAN 1
symptoms persistent , no acute findings on CTH obtained several days pta , difficulty  ambulating , recurrent falls , will obtain MRI as per ortho recommendations  - MRI spine - ortho spine consult pending results   - Neurology consult appreciated   - PT Pt scheduled for surgery and preop instructions including instructions for taking Famotidine  on the day of surgery, given verbally and with use of  written materials, and patient confirming understanding of such instructions using  teach back method.  Pt given information for COVID PCR testing sites and told to make appointment 3-5 days preop   CC requested with echo report   email to surgeon to confirm patient to stay on Plavix / ASA as per patient report

## 2023-03-20 ENCOUNTER — TRANSCRIPTION ENCOUNTER (OUTPATIENT)
Age: 78
End: 2023-03-20

## 2023-03-20 PROBLEM — Z86.73 PERSONAL HISTORY OF TRANSIENT ISCHEMIC ATTACK (TIA), AND CEREBRAL INFARCTION WITHOUT RESIDUAL DEFICITS: Chronic | Status: ACTIVE | Noted: 2023-03-13

## 2023-03-20 PROBLEM — Z86.79 PERSONAL HISTORY OF OTHER DISEASES OF THE CIRCULATORY SYSTEM: Chronic | Status: ACTIVE | Noted: 2023-03-13

## 2023-03-20 PROBLEM — M51.9 UNSPECIFIED THORACIC, THORACOLUMBAR AND LUMBOSACRAL INTERVERTEBRAL DISC DISORDER: Chronic | Status: ACTIVE | Noted: 2023-03-13

## 2023-03-20 NOTE — ASU PATIENT PROFILE, ADULT - PATIENT'S SEXUAL ORIENTATION
Heterosexual Clofazimine Pregnancy And Lactation Text: This medication is Pregnancy Category C and isn't considered safe during pregnancy. It is excreted in breast milk.

## 2023-03-20 NOTE — ASU PATIENT PROFILE, ADULT - BILL OF RIGHTS/ADMISSION INFORMATION PROVIDED TO:
Leg Pain: Care Instructions  Your Care Instructions  Many things can cause leg pain. Too much exercise or overuse can cause a muscle cramp (or charley horse). You can get leg cramps from not eating a balanced diet that has enough potassium, calcium, and other minerals. If you do not drink enough fluids or are taking certain medicines, you may develop leg cramps. Other causes of leg pain include injuries, blood flow problems, nerve damage, and twisted and enlarged veins (varicose veins). You can usually ease pain with self-care. Your doctor may recommend that you rest your leg and keep it elevated. Follow-up care is a key part of your treatment and safety. Be sure to make and go to all appointments, and call your doctor if you are having problems. It's also a good idea to know your test results and keep a list of the medicines you take. How can you care for yourself at home? · Take pain medicines exactly as directed. ? If the doctor gave you a prescription medicine for pain, take it as prescribed. ? If you are not taking a prescription pain medicine, ask your doctor if you can take an over-the-counter medicine. · Take any other medicines exactly as prescribed. Call your doctor if you think you are having a problem with your medicine. · Rest your leg while you have pain, and avoid standing for long periods of time. · Prop up your leg at or above the level of your heart when possible. · Make sure you are eating a balanced diet that is rich in calcium, potassium, and magnesium, especially if you are pregnant. · If directed by your doctor, put ice or a cold pack on the area for 10 to 20 minutes at a time. Put a thin cloth between the ice and your skin. · Your leg may be in a splint, a brace, or an elastic bandage, and you may have crutches to help you walk. Follow your doctor's directions about how long to wear supports and how to use the crutches. When should you call for help?   Call 911 anytime you think you may need emergency care. For example, call if:    · You have sudden chest pain and shortness of breath, or you cough up blood.     · Your leg is cool or pale or changes color.    Call your doctor now or seek immediate medical care if:    · You have increasing or severe pain.     · Your leg suddenly feels weak and you cannot move it.     · You have signs of a blood clot, such as:  ? Pain in your calf, back of the knee, thigh, or groin. ? Redness and swelling in your leg or groin.     · You have signs of infection, such as:  ? Increased pain, swelling, warmth, or redness. ? Red streaks leading from the sore area. ? Pus draining from a place on your leg. ? A fever.     · You cannot bear weight on your leg.    Watch closely for changes in your health, and be sure to contact your doctor if:    · You do not get better as expected. Where can you learn more? Go to http://radha-rtacy.info/. Enter I956 in the search box to learn more about \"Leg Pain: Care Instructions. \"  Current as of: September 23, 2018  Content Version: 11.9  © 5167-3429 [employer]. Care instructions adapted under license by [employer] (which disclaims liability or warranty for this information). If you have questions about a medical condition or this instruction, always ask your healthcare professional. [address] any warranty or liability for your use of this information. Patient

## 2023-03-20 NOTE — ASU PATIENT PROFILE, ADULT - NSICDXPASTMEDICALHX_GEN_ALL_CORE_FT
PAST MEDICAL HISTORY:  CAD (coronary artery disease)     GERD (gastroesophageal reflux disease)     H/O carotid stenosis     History of TIA (transient ischemic attack)     HLD (hyperlipidemia)     HTN (hypertension)     Lumbar disc disorder

## 2023-03-20 NOTE — ASU PATIENT PROFILE, ADULT - FALL HARM RISK - UNIVERSAL INTERVENTIONS
Bed in lowest position, wheels locked, appropriate side rails in place/Call bell, personal items and telephone in reach/Instruct patient to call for assistance before getting out of bed or chair/Non-slip footwear when patient is out of bed/[place] to call system/Physically safe environment - no spills, clutter or unnecessary equipment/Purposeful Proactive Rounding/Room/bathroom lighting operational, light cord in reach

## 2023-03-21 ENCOUNTER — RESULT REVIEW (OUTPATIENT)
Age: 78
End: 2023-03-21

## 2023-03-21 ENCOUNTER — APPOINTMENT (OUTPATIENT)
Dept: VASCULAR SURGERY | Facility: HOSPITAL | Age: 78
End: 2023-03-21

## 2023-03-21 ENCOUNTER — INPATIENT (INPATIENT)
Facility: HOSPITAL | Age: 78
LOS: 0 days | Discharge: HOME | End: 2023-03-22
Attending: SURGERY | Admitting: SURGERY
Payer: MEDICARE

## 2023-03-21 VITALS
DIASTOLIC BLOOD PRESSURE: 81 MMHG | WEIGHT: 212.08 LBS | SYSTOLIC BLOOD PRESSURE: 145 MMHG | OXYGEN SATURATION: 95 % | TEMPERATURE: 98 F | HEIGHT: 68 IN | RESPIRATION RATE: 16 BRPM | HEART RATE: 59 BPM

## 2023-03-21 DIAGNOSIS — I77.9 DISORDER OF ARTERIES AND ARTERIOLES, UNSPECIFIED: ICD-10-CM

## 2023-03-21 DIAGNOSIS — I10 ESSENTIAL (PRIMARY) HYPERTENSION: ICD-10-CM

## 2023-03-21 DIAGNOSIS — Z98.890 OTHER SPECIFIED POSTPROCEDURAL STATES: Chronic | ICD-10-CM

## 2023-03-21 DIAGNOSIS — Z86.79 PERSONAL HISTORY OF OTHER DISEASES OF THE CIRCULATORY SYSTEM: ICD-10-CM

## 2023-03-21 LAB
GAS PNL BLDA: SIGNIFICANT CHANGE UP

## 2023-03-21 PROCEDURE — 35301 RECHANNELING OF ARTERY: CPT | Mod: RT

## 2023-03-21 PROCEDURE — 88304 TISSUE EXAM BY PATHOLOGIST: CPT | Mod: 26

## 2023-03-21 PROCEDURE — 88311 DECALCIFY TISSUE: CPT | Mod: 26

## 2023-03-21 DEVICE — SHUNT ARGYLE CAROTID ARTERY KIT 6": Type: IMPLANTABLE DEVICE | Site: RIGHT | Status: FUNCTIONAL

## 2023-03-21 DEVICE — PATCH CAROTID UT 8X75MM: Type: IMPLANTABLE DEVICE | Site: RIGHT | Status: FUNCTIONAL

## 2023-03-21 DEVICE — SURGIFOAM PAD 8CM X 12.5CM X 2MM (100C): Type: IMPLANTABLE DEVICE | Site: RIGHT | Status: FUNCTIONAL

## 2023-03-21 RX ORDER — ACETAMINOPHEN 500 MG
975 TABLET ORAL EVERY 8 HOURS
Refills: 0 | Status: DISCONTINUED | OUTPATIENT
Start: 2023-03-21 | End: 2023-03-22

## 2023-03-21 RX ORDER — OXYCODONE HYDROCHLORIDE 5 MG/1
5 TABLET ORAL EVERY 6 HOURS
Refills: 0 | Status: DISCONTINUED | OUTPATIENT
Start: 2023-03-21 | End: 2023-03-22

## 2023-03-21 RX ORDER — ASPIRIN/CALCIUM CARB/MAGNESIUM 324 MG
81 TABLET ORAL DAILY
Refills: 0 | Status: DISCONTINUED | OUTPATIENT
Start: 2023-03-21 | End: 2023-03-22

## 2023-03-21 RX ORDER — METOPROLOL TARTRATE 50 MG
50 TABLET ORAL DAILY
Refills: 0 | Status: DISCONTINUED | OUTPATIENT
Start: 2023-03-22 | End: 2023-03-22

## 2023-03-21 RX ORDER — ESCITALOPRAM OXALATE 10 MG/1
10 TABLET, FILM COATED ORAL DAILY
Refills: 0 | Status: DISCONTINUED | OUTPATIENT
Start: 2023-03-21 | End: 2023-03-22

## 2023-03-21 RX ORDER — CLOPIDOGREL BISULFATE 75 MG/1
75 TABLET, FILM COATED ORAL DAILY
Refills: 0 | Status: DISCONTINUED | OUTPATIENT
Start: 2023-03-21 | End: 2023-03-22

## 2023-03-21 RX ORDER — HYDROMORPHONE HYDROCHLORIDE 2 MG/ML
1 INJECTION INTRAMUSCULAR; INTRAVENOUS; SUBCUTANEOUS
Refills: 0 | Status: DISCONTINUED | OUTPATIENT
Start: 2023-03-21 | End: 2023-03-21

## 2023-03-21 RX ORDER — ACETAMINOPHEN 500 MG
1000 TABLET ORAL ONCE
Refills: 0 | Status: COMPLETED | OUTPATIENT
Start: 2023-03-21 | End: 2023-03-21

## 2023-03-21 RX ORDER — LISINOPRIL 2.5 MG/1
40 TABLET ORAL DAILY
Refills: 0 | Status: DISCONTINUED | OUTPATIENT
Start: 2023-03-22 | End: 2023-03-22

## 2023-03-21 RX ORDER — ATORVASTATIN CALCIUM 80 MG/1
40 TABLET, FILM COATED ORAL AT BEDTIME
Refills: 0 | Status: DISCONTINUED | OUTPATIENT
Start: 2023-03-21 | End: 2023-03-22

## 2023-03-21 RX ORDER — DEXTROSE MONOHYDRATE, SODIUM CHLORIDE, AND POTASSIUM CHLORIDE 50; .745; 4.5 G/1000ML; G/1000ML; G/1000ML
1000 INJECTION, SOLUTION INTRAVENOUS
Refills: 0 | Status: DISCONTINUED | OUTPATIENT
Start: 2023-03-21 | End: 2023-03-22

## 2023-03-21 RX ORDER — HEPARIN SODIUM 5000 [USP'U]/ML
5000 INJECTION INTRAVENOUS; SUBCUTANEOUS EVERY 8 HOURS
Refills: 0 | Status: DISCONTINUED | OUTPATIENT
Start: 2023-03-21 | End: 2023-03-22

## 2023-03-21 RX ORDER — ONDANSETRON 8 MG/1
4 TABLET, FILM COATED ORAL ONCE
Refills: 0 | Status: DISCONTINUED | OUTPATIENT
Start: 2023-03-21 | End: 2023-03-21

## 2023-03-21 RX ORDER — HYDROMORPHONE HYDROCHLORIDE 2 MG/ML
0.5 INJECTION INTRAMUSCULAR; INTRAVENOUS; SUBCUTANEOUS
Refills: 0 | Status: DISCONTINUED | OUTPATIENT
Start: 2023-03-21 | End: 2023-03-21

## 2023-03-21 RX ADMIN — HYDROMORPHONE HYDROCHLORIDE 0.5 MILLIGRAM(S): 2 INJECTION INTRAMUSCULAR; INTRAVENOUS; SUBCUTANEOUS at 14:23

## 2023-03-21 RX ADMIN — Medication 81 MILLIGRAM(S): at 18:18

## 2023-03-21 RX ADMIN — HYDROMORPHONE HYDROCHLORIDE 1 MILLIGRAM(S): 2 INJECTION INTRAMUSCULAR; INTRAVENOUS; SUBCUTANEOUS at 19:30

## 2023-03-21 RX ADMIN — Medication 400 MILLIGRAM(S): at 13:08

## 2023-03-21 RX ADMIN — ATORVASTATIN CALCIUM 40 MILLIGRAM(S): 80 TABLET, FILM COATED ORAL at 21:15

## 2023-03-21 RX ADMIN — HYDROMORPHONE HYDROCHLORIDE 0.5 MILLIGRAM(S): 2 INJECTION INTRAMUSCULAR; INTRAVENOUS; SUBCUTANEOUS at 14:44

## 2023-03-21 RX ADMIN — Medication 1000 MILLIGRAM(S): at 13:25

## 2023-03-21 RX ADMIN — HYDROMORPHONE HYDROCHLORIDE 1 MILLIGRAM(S): 2 INJECTION INTRAMUSCULAR; INTRAVENOUS; SUBCUTANEOUS at 20:00

## 2023-03-21 RX ADMIN — Medication 975 MILLIGRAM(S): at 21:55

## 2023-03-21 RX ADMIN — HEPARIN SODIUM 5000 UNIT(S): 5000 INJECTION INTRAVENOUS; SUBCUTANEOUS at 18:18

## 2023-03-21 RX ADMIN — SODIUM CHLORIDE 30 MILLILITER(S): 9 INJECTION, SOLUTION INTRAVENOUS at 13:26

## 2023-03-21 RX ADMIN — DEXTROSE MONOHYDRATE, SODIUM CHLORIDE, AND POTASSIUM CHLORIDE 75 MILLILITER(S): 50; .745; 4.5 INJECTION, SOLUTION INTRAVENOUS at 13:50

## 2023-03-21 RX ADMIN — ESCITALOPRAM OXALATE 10 MILLIGRAM(S): 10 TABLET, FILM COATED ORAL at 18:17

## 2023-03-21 RX ADMIN — HYDROMORPHONE HYDROCHLORIDE 0.5 MILLIGRAM(S): 2 INJECTION INTRAMUSCULAR; INTRAVENOUS; SUBCUTANEOUS at 13:26

## 2023-03-21 RX ADMIN — CLOPIDOGREL BISULFATE 75 MILLIGRAM(S): 75 TABLET, FILM COATED ORAL at 18:17

## 2023-03-21 RX ADMIN — HYDROMORPHONE HYDROCHLORIDE 0.5 MILLIGRAM(S): 2 INJECTION INTRAMUSCULAR; INTRAVENOUS; SUBCUTANEOUS at 13:45

## 2023-03-21 NOTE — CHART NOTE - NSCHARTNOTEFT_GEN_A_CORE
Procedure:  Surgeon:    S: Pt has no complaints. Denies CP, SOB, MOHAN, calf tenderness. Pain controlled with medication.    O:  T(C): 36.4 (03-21-23 @ 16:00), Max: 36.8 (03-21-23 @ 15:00)  T(F): 97.6 (03-21-23 @ 16:00), Max: 98.3 (03-21-23 @ 15:00)  HR: 61 (03-21-23 @ 17:00) (60 - 78)  BP: 125/60 (03-21-23 @ 17:00) (125/60 - 130/66)  RR: 11 (03-21-23 @ 17:00) (11 - 18)  SpO2: 95% (03-21-23 @ 17:00) (94% - 97%)  Wt(kg): --            Gen: NAD, resting comfortably in bed  C/V: NSR  Pulm: Nonlabored breathing, no respiratory distress  Abd: soft, NT/ND Incision:  Extrem: WWP, no calf edema, SCDs in place      A/P: 77yMale s/p above procedure  Diet:  IVF:  Pain/nausea control  DVT ppx:  Dispo plan: Vascular Surgery Post op Check    Pt seen and examined without complaints. Pain is controlled. Denies SOB/CP/N/V.     Vital Signs Last 24 Hrs  T(C): 36.4 (21 Mar 2023 16:00), Max: 36.9 (21 Mar 2023 12:25)  T(F): 97.6 (21 Mar 2023 16:00), Max: 98.4 (21 Mar 2023 12:25)  HR: 66 (21 Mar 2023 19:00) (59 - 78)  BP: 124/58 (21 Mar 2023 19:00) (124/58 - 151/82)  BP(mean): 74 (21 Mar 2023 19:00) (74 - 98)  RR: 14 (21 Mar 2023 19:00) (10 - 18)  SpO2: 96% (21 Mar 2023 19:00) (92% - 97%)    Parameters below as of 21 Mar 2023 18:00  Patient On (Oxygen Delivery Method): room air        I&O's Summary    21 Mar 2023 07:01  -  21 Mar 2023 19:23  --------------------------------------------------------  IN: 150 mL / OUT: 205 mL / NET: -55 mL        Physical Exam  GEN: NAD, resting quietly  Neck: flat, right neck dressing moderately saturated, changed sterily bedside  Neuro: CN2-12 intact  PULM: symmetric chest rise bilaterally, no increased WOB  ABD: soft, nontender, nondistended, no rebound or guarding  EXTR: no LE erythema, moving all extremities      A/P: 77y Male s/p right carotid endarterectomy for right carotid stenosis  -ASA  -Plavix  --160's  -d/c'd LLUVIA wick  -Analgesia and antiemetics as needed  -Diet: Reg  -Dispo: floor     [facility] Sebas, PGY-1 Vascular Surgery Post op Check    Pt seen and examined, resting in bed. He says that he has pain radiating from his incision to the back of his neck. Pt is able to swallow, speak, and denies dyspnea. Denies SOB/CP/N/V.     Vital Signs Last 24 Hrs  T(C): 36.4 (21 Mar 2023 16:00), Max: 36.9 (21 Mar 2023 12:25)  T(F): 97.6 (21 Mar 2023 16:00), Max: 98.4 (21 Mar 2023 12:25)  HR: 66 (21 Mar 2023 19:00) (59 - 78)  BP: 124/58 (21 Mar 2023 19:00) (124/58 - 151/82)  BP(mean): 74 (21 Mar 2023 19:00) (74 - 98)  RR: 14 (21 Mar 2023 19:00) (10 - 18)  SpO2: 96% (21 Mar 2023 19:00) (92% - 97%)    Parameters below as of 21 Mar 2023 18:00  Patient On (Oxygen Delivery Method): room air        I&O's Summary    21 Mar 2023 07:01  -  21 Mar 2023 19:23  --------------------------------------------------------  IN: 150 mL / OUT: 205 mL / NET: -55 mL        Physical Exam  GEN: NAD, resting quietly  Neck: flat, right neck dressing moderately saturated, changed sterily bedside  Neuro: CN2-12 intact  PULM: symmetric chest rise bilaterally, no increased WOB  ABD: soft, nontender, nondistended, no rebound or guarding  EXTR: no LE erythema, moving all extremities      A/P: 77y Male s/p right carotid endarterectomy for right carotid stenosis  -ASA  -Plavix  --160's  -d/c'd LLUVIA wick  -Analgesia and antiemetics as needed  -Diet: Reg  -Dispo: floor     [facility] Sebas, PGY-1

## 2023-03-21 NOTE — ASU PREOP CHECKLIST - 1.
Pt able to understand/ speak good amount of English. If he does not understand, pt wishes for son Aziza to translate for him at bedside.

## 2023-03-22 ENCOUNTER — TRANSCRIPTION ENCOUNTER (OUTPATIENT)
Age: 78
End: 2023-03-22

## 2023-03-22 VITALS
RESPIRATION RATE: 18 BRPM | DIASTOLIC BLOOD PRESSURE: 63 MMHG | TEMPERATURE: 99 F | SYSTOLIC BLOOD PRESSURE: 145 MMHG | OXYGEN SATURATION: 94 % | HEART RATE: 72 BPM

## 2023-03-22 LAB
ANION GAP SERPL CALC-SCNC: 9 MMOL/L — SIGNIFICANT CHANGE UP (ref 7–14)
BUN SERPL-MCNC: 16 MG/DL — SIGNIFICANT CHANGE UP (ref 7–23)
CALCIUM SERPL-MCNC: 8.6 MG/DL — SIGNIFICANT CHANGE UP (ref 8.4–10.5)
CHLORIDE SERPL-SCNC: 106 MMOL/L — SIGNIFICANT CHANGE UP (ref 98–107)
CO2 SERPL-SCNC: 25 MMOL/L — SIGNIFICANT CHANGE UP (ref 22–31)
CREAT SERPL-MCNC: 0.99 MG/DL — SIGNIFICANT CHANGE UP (ref 0.5–1.3)
EGFR: 77 ML/MIN/1.73M2 — SIGNIFICANT CHANGE UP
GLUCOSE SERPL-MCNC: 138 MG/DL — HIGH (ref 70–99)
HCT VFR BLD CALC: 35.6 % — LOW (ref 39–50)
HGB BLD-MCNC: 11.6 G/DL — LOW (ref 13–17)
MCHC RBC-ENTMCNC: 30.6 PG — SIGNIFICANT CHANGE UP (ref 27–34)
MCHC RBC-ENTMCNC: 32.6 GM/DL — SIGNIFICANT CHANGE UP (ref 32–36)
MCV RBC AUTO: 93.9 FL — SIGNIFICANT CHANGE UP (ref 80–100)
NRBC # BLD: 0 /100 WBCS — SIGNIFICANT CHANGE UP (ref 0–0)
NRBC # FLD: 0 K/UL — SIGNIFICANT CHANGE UP (ref 0–0)
PLATELET # BLD AUTO: 136 K/UL — LOW (ref 150–400)
POTASSIUM SERPL-MCNC: 4.4 MMOL/L — SIGNIFICANT CHANGE UP (ref 3.5–5.3)
POTASSIUM SERPL-SCNC: 4.4 MMOL/L — SIGNIFICANT CHANGE UP (ref 3.5–5.3)
RBC # BLD: 3.79 M/UL — LOW (ref 4.2–5.8)
RBC # FLD: 12.6 % — SIGNIFICANT CHANGE UP (ref 10.3–14.5)
SODIUM SERPL-SCNC: 140 MMOL/L — SIGNIFICANT CHANGE UP (ref 135–145)
WBC # BLD: 8.37 K/UL — SIGNIFICANT CHANGE UP (ref 3.8–10.5)
WBC # FLD AUTO: 8.37 K/UL — SIGNIFICANT CHANGE UP (ref 3.8–10.5)

## 2023-03-22 RX ORDER — ACETAMINOPHEN 500 MG
2 TABLET ORAL
Qty: 0 | Refills: 0 | DISCHARGE
Start: 2023-03-22

## 2023-03-22 RX ORDER — OXYCODONE AND ACETAMINOPHEN 5; 325 MG/1; MG/1
1 TABLET ORAL
Qty: 0 | Refills: 0 | DISCHARGE

## 2023-03-22 RX ORDER — OXYCODONE HYDROCHLORIDE 5 MG/1
1 TABLET ORAL
Qty: 12 | Refills: 0
Start: 2023-03-22 | End: 2023-03-24

## 2023-03-22 RX ADMIN — CLOPIDOGREL BISULFATE 75 MILLIGRAM(S): 75 TABLET, FILM COATED ORAL at 12:28

## 2023-03-22 RX ADMIN — HEPARIN SODIUM 5000 UNIT(S): 5000 INJECTION INTRAVENOUS; SUBCUTANEOUS at 02:07

## 2023-03-22 RX ADMIN — DEXTROSE MONOHYDRATE, SODIUM CHLORIDE, AND POTASSIUM CHLORIDE 75 MILLILITER(S): 50; .745; 4.5 INJECTION, SOLUTION INTRAVENOUS at 05:31

## 2023-03-22 RX ADMIN — LISINOPRIL 40 MILLIGRAM(S): 2.5 TABLET ORAL at 05:31

## 2023-03-22 RX ADMIN — OXYCODONE HYDROCHLORIDE 5 MILLIGRAM(S): 5 TABLET ORAL at 00:56

## 2023-03-22 RX ADMIN — Medication 50 MILLIGRAM(S): at 05:31

## 2023-03-22 RX ADMIN — ESCITALOPRAM OXALATE 10 MILLIGRAM(S): 10 TABLET, FILM COATED ORAL at 12:28

## 2023-03-22 RX ADMIN — Medication 975 MILLIGRAM(S): at 05:31

## 2023-03-22 RX ADMIN — OXYCODONE HYDROCHLORIDE 5 MILLIGRAM(S): 5 TABLET ORAL at 01:56

## 2023-03-22 RX ADMIN — Medication 81 MILLIGRAM(S): at 12:27

## 2023-03-22 NOTE — DISCHARGE NOTE PROVIDER - CARE PROVIDER_API CALL
Blaine Swain)  Surgery; Vascular Surgery  [phone] [place] [address]  Phone: [phone]  Fax: [phone]  Follow Up Time: 1 week

## 2023-03-22 NOTE — PATIENT PROFILE ADULT - PATIENT'S PREFERRED PRONOUN
Physical Therapy Evaluation    Patient's Name: Nora Pierce      Admitting Diagnosis  Acute respiratory failure (HCC) [J96 00]  Hypercapnia [R06 89]  Lower extremity edema [R60 0]  SOB (shortness of breath) [R06 02]  Pleural effusion [J90]  COPD exacerbation (HCC) [J44 1]    Problem List  Patient Active Problem List   Diagnosis    COPD (chronic obstructive pulmonary disease) (HCC)    HLD (hyperlipidemia)    Type 2 diabetes mellitus with hyperglycemia, with long-term current use of insulin (HCC)    Venous stasis dermatitis of both lower extremities    Pleural effusion on right    Chronic diastolic heart failure (HCC)    Essential hypertension    Diabetic neuropathy (HCC)    Chronic diastolic (congestive) heart failure (HCC)    CAD (coronary artery disease)    Acute metabolic encephalopathy    RBBB    Oxygen dependent    COPD with acute exacerbation (HCC)    Pulmonary hypertension (HCC)    JACQUELINE (obstructive sleep apnea)    Lung nodule    DDD (degenerative disc disease), lumbar    Anemia, iron deficiency    PAD (peripheral artery disease) (HCC)    Acute on chronic respiratory failure with hypoxia and hypercapnia (HCC)    Fracture of navicular bone of left foot    Adenocarcinoma of lung (HCC)    History of prostate cancer    Stage 3b chronic kidney disease (CKD) ([address] )    Chronic left-sided low back pain without sciatica    Epididymitis, bilateral    Hypothermia    Frequent hospital admissions    [facility])    Acute-on-chronic kidney injury ([facility] [address] )    SIRS (systemic inflammatory response syndrome) ([address] )    Acute on chronic anemia       Past Medical History  Past Medical History:   Diagnosis Date    Abdominal pain     MARANDA (acute kidney injury) ([facility] [address] ) 6/19/2018    Cardiac disease     CHF (congestive heart failure) (HCC)     COPD, severe ([address] )     Coronary artery disease     DDD (degenerative disc disease), lumbar 9/1/2020    Diabetes mellitus ([address] )     Dyspnea     GERD (gastroesophageal reflux disease) Hyperlipidemia     Hypertension     MI (myocardial infarction) ([facility] [address] )     with 3 stents    Nodule of apex of right lung     JACQUELINE (obstructive sleep apnea)     Prostate cancer [facility])        Past Surgical History  Past Surgical History:   Procedure Laterality Date    ABDOMINAL SURGERY      exploratory    ANGIOPLASTY      3 stents    APPENDECTOMY      COLONOSCOPY  2015    CT NEEDLE BIOPSY LUNG  11/3/2020    ESOPHAGOGASTRODUODENOSCOPY N/A 10/2/2017    Procedure: ESOPHAGOGASTRODUODENOSCOPY (EGD); Surgeon: John Steel MD;  Location: [facility]; Service: Gastroenterology    IR THORACENTESIS  11/3/2020    KNEE CARTILAGE SURGERY      OTHER SURGICAL HISTORY      stent placement    PROSTATE SURGERY      SKIN GRAFT      Basal cell CA back        10/30/22 0934   PT Last Visit   PT Visit Date 10/30/22   Note Type   Note type Evaluation   Pain Assessment   Pain Assessment Tool 0-10   Pain Score No Pain   Restrictions/Precautions   Weight Bearing Precautions Per Order No   Other Precautions Telemetry; Fall Risk   Home Living   Type of [address] Two level;Performs ADLs on one level  (0 VINI)   Home Equipment Walker;Cane;Wheelchair-electric;Electric scooter  (home O2)   Additional Comments Pt lives in a [facility] with 0 VINI, stays on 1st floor  Reports using SPC vs no AD to walk room to room within his house, but relies on scooter for community mobility   Prior Function   Level of [place] Independent with ADLs; Independent with functional mobility   Lives With Son   Receives Help From Family   IADLs Independent with driving   Falls in the last 6 months 0   Cognition   Overall Cognitive Status WFL   Attention Within functional limits   Orientation Level Oriented X4   Memory Within functional limits   Following Commands Follows one step commands without difficulty   Comments Pt initially refusing therapy, but upon returning agreeble to work with therapy minimally   RLE Assessment   RLE Assessment WFL   LLE Assessment   LLE Assessment WFL   Bed Mobility   Additional Comments Pt seated EOB at start and end of session   Transfers   Sit to Stand 5  Supervision   Stand to Sit 5  Supervision   Additional Comments no AD, pt declining   Ambulation/Elevation   Gait pattern Excessively slow   Gait Assistance 5  Supervision   Assistive Device None   Distance 1ft advance/retreat   Ambulation/Elevation Additional Comments Pt refusing to ambulate any further with therapy, stating that he has no concerns about going home upon d/c, claims he is at his baseline; does not want to work with therapy while in the hospital but agreeable to home services   Balance   Static Sitting Good   Dynamic Sitting Fair +   Static Standing Fair   Dynamic Standing Fair   Ambulatory Fair -   Activity Tolerance   Activity Tolerance Other (Comment)  (limited by pt participation)   Medical Staff Made Aware Seen with OT 2* pt's limited activity tolerance and multiple comorbidities  PT focus on functional transfers and mobility   Nurse Made Aware ok to see per RN   Assessment   Prognosis Fair   Assessment Pt is a 74 y o  male seen for PT evaluation s/p admit to [facility] on 10/26/2022  Pt was admitted with a primary dx of: acute on chronic respiratory failure with hypoxia and hypercapnia  Pt initially had to be intubated, was then extubated on 10/27  PT now consulted for assessment of mobility and d/c needs  Pt with Up with assistance, PT evaluation orders  Pts current comorbidities effecting treatment include: MARANDA, cardiac disease, COPD, CAD, DDD, DM, HTN, MI, prostate cancer  Pts current clinical presentation is Evolving (medium complexity) due to Ongoing medical management for primary dx, Decreased activity tolerance compared to baseline, Ongoing telemetry monitoring, Trending lab values  Prior to admission, pt was living with his son in a [facility] with 0 VINI, has [address]   Pt reports being I with ADLs; uses SPC vs no AD to go room to room and then uses electric scooter for community distances  Upon evaluation, pt currently is requiring supervision for transfers and supervision for ambulation 1 ft advance/retreat w/ no AD (pt refusing any further ambulation despite therapy encouragement)  Pt presents at PT eval functioning near his baseline mobility level, no acute PT needs identified  Pt has all necessary DME and an accessible home environment; denies any concerns about returning home upon d/c  PT signing off  At conclusion of PT session pt seated EOB with phone and call bell within reach  Pt denies any further questions at this time  The patient's AM-PAC Basic Mobility Inpatient Short Form Raw Score is 19  A Raw score of greater than 16 suggests the patient may benefit from discharge to home  Please also refer to the recommendation of the Physical Therapist for safe discharge planning  Recommend HHPT upon hospital D/C  Goals   Patient Goals to rest   Plan   PT Frequency Other (Comment)  (eval only, D/C PT)   Recommendation   PT Discharge Recommendation Home with home health rehabilitation   Equipment Recommended   (pt has all necessary DME)   AM-PAC Basic Mobility Inpatient   Turning in Bed Without Bedrails 4   Lying on Back to Sitting on Edge of Flat Bed 3   Moving Bed to Chair 3   Standing Up From Chair 3   Walk in Room 3   Climb 3-5 Stairs 3   Basic Mobility Inpatient Raw Score 19   Basic Mobility Standardized Score 42 48   Highest Level Of Mobility   JH-HLM Goal 6: Walk 10 steps or more   JH-HLM Achieved 5: Stand (1 or more minutes)   Modified [place] Scale   Modified [place] Scale 3   End of Consult   Patient Position at End of Consult Seated edge of bed; All needs within reach       Carlton Fisher, PT, DPT Him/He

## 2023-03-22 NOTE — DISCHARGE NOTE NURSING/CASE MANAGEMENT/SOCIAL WORK - PATIENT PORTAL LINK FT
You can access the FollowMyHealth Patient Portal offered by [facility] by registering at the following website: [email or web address]. By joining [employer]’s FollowMyHealth portal, you will also be able to view your health information using other applications (apps) compatible with our system.

## 2023-03-22 NOTE — DISCHARGE NOTE PROVIDER - NSDCCPTREATMENT_GEN_ALL_CORE_FT
Total Number Of Fractions Rx 3: 15 PRINCIPAL PROCEDURE  Procedure: Right carotid endarterectomy  Findings and Treatment:

## 2023-03-22 NOTE — DISCHARGE NOTE PROVIDER - NSDCFUADDAPPT_GEN_ALL_CORE_FT
Follow up with your vascular surgeon, Dr. Swain. Call for an appointment  Please follow up with your primary care physician regarding your hospitalization   Do not drive while taking pain medications

## 2023-03-22 NOTE — PROGRESS NOTE ADULT - ASSESSMENT
77y Male s/p right carotid endarterectomy for right carotid stenosis    PLAN:  -ASA  -Plavix  --160's  -Analgesia and antiemetics as needed  -Diet: Reg  -Discharge home today.

## 2023-03-22 NOTE — PATIENT PROFILE ADULT - FALL HARM RISK - HARM RISK INTERVENTIONS

## 2023-03-22 NOTE — DISCHARGE NOTE NURSING/CASE MANAGEMENT/SOCIAL WORK - NSDCPEFALRISK_GEN_ALL_CORE
For information on Fall & Injury Prevention, visit: [email or web address] OR  [email or web address] OR  https://www.cdc.gov/steadi/patient.html

## 2023-03-22 NOTE — PROGRESS NOTE ADULT - SUBJECTIVE AND OBJECTIVE BOX
TEAM SURGERY PROGRESS NOTE    POST OPERATIVE DAY #: 1     Interval events: Silvestre removed, passed TOV.    SUBJECTIVE: Patient seen and examined at bedside on AM rounds, patient without complaints. Denies numbness/tingling in extremities, confusion, dizziness.     Vital Signs Last 24 Hrs  T(C): 36.5 (22 Mar 2023 04:50), Max: 37.1 (21 Mar 2023 22:17)  T(F): 97.7 (22 Mar 2023 04:50), Max: 98.8 (21 Mar 2023 22:17)  HR: 71 (22 Mar 2023 04:50) (60 - 86)  BP: 143/76 (22 Mar 2023 04:50) (124/58 - 151/82)  BP(mean): 78 (21 Mar 2023 21:00) (74 - 98)  RR: 18 (22 Mar 2023 04:50) (10 - 20)  SpO2: 96% (22 Mar 2023 04:50) (92% - 100%)    Parameters below as of 22 Mar 2023 04:50  Patient On (Oxygen Delivery Method): room air      I&O's Detail    21 Mar 2023 07:01  -  22 Mar 2023 07:00  --------------------------------------------------------  IN:    dextrose 5% + sodium chloride 0.45% w/ Additives: 1200 mL    Oral Fluid: 150 mL  Total IN: 1350 mL    OUT:    Indwelling Catheter - Urethral (mL): 205 mL    Voided (mL): 400 mL  Total OUT: 605 mL    Total NET: 745 mL        MEDICATIONS  (STANDING):  acetaminophen     Tablet .. 975 milliGRAM(s) Oral every 8 hours  aspirin  chewable 81 milliGRAM(s) Oral daily  atorvastatin 40 milliGRAM(s) Oral at bedtime  clopidogrel Tablet 75 milliGRAM(s) Oral daily  escitalopram 10 milliGRAM(s) Oral daily  heparin   Injectable 5000 Unit(s) SubCutaneous every 8 hours  lisinopril 40 milliGRAM(s) Oral daily  metoprolol succinate ER 50 milliGRAM(s) Oral daily    MEDICATIONS  (PRN):  oxyCODONE    IR 5 milliGRAM(s) Oral every 6 hours PRN Moderate Pain (4 - 6)      Physical Exam  GEN: NAD, resting quietly  Neck: dressing c/d/i. Soft, no hematoma.   Neuro: CN2-12 intact  PULM: symmetric chest rise bilaterally, no increased WOB  ABD: soft, nontender, nondistended, no rebound or guarding  EXTR: no LE erythema, moving all extremities    LABS:                        11.6   8.37  )-----------( 136      ( 22 Mar 2023 07:00 )             35.6                   Patient is a 77y Male     TEAM SURGERY PROGRESS NOTE    POST OPERATIVE DAY #: 1     Interval events: Silvestre removed, passed TOV.    SUBJECTIVE: Patient seen and examined at bedside on AM rounds, patient without complaints. Denies numbness/tingling in extremities, confusion, dizziness.     Vital Signs Last 24 Hrs  T(C): 36.5 (22 Mar 2023 04:50), Max: 37.1 (21 Mar 2023 22:17)  T(F): 97.7 (22 Mar 2023 04:50), Max: 98.8 (21 Mar 2023 22:17)  HR: 71 (22 Mar 2023 04:50) (60 - 86)  BP: 143/76 (22 Mar 2023 04:50) (124/58 - 151/82)  BP(mean): 78 (21 Mar 2023 21:00) (74 - 98)  RR: 18 (22 Mar 2023 04:50) (10 - 20)  SpO2: 96% (22 Mar 2023 04:50) (92% - 100%)    Parameters below as of 22 Mar 2023 04:50  Patient On (Oxygen Delivery Method): room air      I&O's Detail    21 Mar 2023 07:01  -  22 Mar 2023 07:00  --------------------------------------------------------  IN:    dextrose 5% + sodium chloride 0.45% w/ Additives: 1200 mL    Oral Fluid: 150 mL  Total IN: 1350 mL    OUT:    Indwelling Catheter - Urethral (mL): 205 mL    Voided (mL): 400 mL  Total OUT: 605 mL    Total NET: 745 mL        MEDICATIONS  (STANDING):  acetaminophen     Tablet .. 975 milliGRAM(s) Oral every 8 hours  aspirin  chewable 81 milliGRAM(s) Oral daily  atorvastatin 40 milliGRAM(s) Oral at bedtime  clopidogrel Tablet 75 milliGRAM(s) Oral daily  escitalopram 10 milliGRAM(s) Oral daily  heparin   Injectable 5000 Unit(s) SubCutaneous every 8 hours  lisinopril 40 milliGRAM(s) Oral daily  metoprolol succinate ER 50 milliGRAM(s) Oral daily    MEDICATIONS  (PRN):  oxyCODONE    IR 5 milliGRAM(s) Oral every 6 hours PRN Moderate Pain (4 - 6)      Physical Exam  GEN: NAD, resting quietly  Neck: dressing saturated with old blood. Dressing changed this am. Soft, no hematoma.   Neuro: CN2-12 intact  PULM: symmetric chest rise bilaterally, no increased WOB  ABD: soft, nontender, nondistended, no rebound or guarding  EXTR: no LE erythema, moving all extremities    LABS:                        11.6   8.37  )-----------( 136      ( 22 Mar 2023 07:00 )             35.6                   Patient is a 77y Male

## 2023-03-22 NOTE — DISCHARGE NOTE PROVIDER - HOSPITAL COURSE
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  Pain well controlled, diet advanced and tolerated, and plavix and ASA resumed  Patient stable for discharge home to follow up as an outpatient

## 2023-03-22 NOTE — DISCHARGE NOTE PROVIDER - NSDCMRMEDTOKEN_GEN_ALL_CORE_FT
acetaminophen 325 mg oral tablet: 2 tab(s) orally every 8 hours, As Needed  aspirin 81 mg oral tablet: 1 tab(s) orally once a day  atorvastatin 40 mg oral tablet: 1 tab(s) orally once a day (at bedtime)  clopidogrel 75 mg oral tablet: 1 tab(s) orally once a day  escitalopram 10 mg oral tablet: 1 tab(s) orally once a day (at bedtime)  gabapentin 300 mg oral tablet: 1 tab(s) orally 2 times a day, As Needed  metoprolol succinate 50 mg oral tablet, extended release: 1 tab(s) orally once a day  oxyCODONE 5 mg oral tablet: 1 tab(s) orally every 6 hours, As needed, Moderate Pain (4 - 6) MDD:4  quinapril 40 mg oral tablet: 1 tab(s) orally once a day

## 2023-03-22 NOTE — DISCHARGE NOTE PROVIDER - NSDCCPCAREPLAN_GEN_ALL_CORE_FT
PRINCIPAL DISCHARGE DIAGNOSIS  Diagnosis: H/O carotid stenosis  Assessment and Plan of Treatment:

## 2023-03-22 NOTE — DISCHARGE NOTE NURSING/CASE MANAGEMENT/SOCIAL WORK - NSCORESITESY/N_GEN_A_CORE_RD
Subjective:   Patient ID:  Shirlene Olvera is a 62 y.o. female who presents for follow-up of Follow-up; Hypertension; and Coronary Artery Disease  Patient denies CP, angina or anginal equivalent.Pt tolerating repatha now. Pt has decreased her smoking.    Hypertension   This is a chronic problem. The current episode started more than 1 year ago. The problem has been gradually improving since onset. The problem is controlled. Pertinent negatives include no chest pain, palpitations or shortness of breath. Past treatments include angiotensin blockers, diuretics and beta blockers. The current treatment provides moderate improvement. There are no compliance problems.    Hyperlipidemia   This is a chronic problem. The current episode started more than 1 year ago. Recent lipid tests were reviewed and are variable. Pertinent negatives include no chest pain or shortness of breath. Treatments tried: repatha. The current treatment provides moderate improvement of lipids. Compliance problems include medication side effects.        Review of Systems   Constitution: Negative. Negative for weight gain.   HENT: Negative.    Eyes: Negative.    Cardiovascular: Negative.  Negative for chest pain, leg swelling and palpitations.   Respiratory: Negative.  Negative for shortness of breath.    Endocrine: Negative.    Hematologic/Lymphatic: Negative.    Skin: Negative.    Musculoskeletal: Negative for muscle weakness.   Gastrointestinal: Negative.    Genitourinary: Negative.    Neurological: Negative.  Negative for dizziness.   Psychiatric/Behavioral: Negative.    Allergic/Immunologic: Negative.      Family History   Problem Relation Age of Onset    Heart attack Father 56        MI    Asthma Neg Hx     Thyroid disease Neg Hx     Migraines Neg Hx     Cancer Neg Hx      Past Medical History:   Diagnosis Date    Colon polyp     Coronary artery disease     pt states MI June 2015    Hypertension     Myocardial infarction     PAD  (peripheral artery disease)     Tobacco use      Social History     Socioeconomic History    Marital status:      Spouse name: Not on file    Number of children: 3    Years of education: Not on file    Highest education level: Not on file   Occupational History    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     Employer: [employer] board   Social Needs    Financial resource strain: Not on file    Food insecurity:     Worry: Not on file     Inability: Not on file    Transportation needs:     Medical: Not on file     Non-medical: Not on file   Tobacco Use    Smoking status: Current Every Day Smoker     Packs/day: 0.25    Smokeless tobacco: Never Used   Substance and Sexual Activity    Alcohol use: Yes     Alcohol/week: 0.0 oz     Comment: once a month    Drug use: No    Sexual activity: Never   Lifestyle    Physical activity:     Days per week: Not on file     Minutes per session: Not on file    Stress: Not on file   Relationships    Social connections:     Talks on phone: Not on file     Gets together: Not on file     Attends Caodaism service: Not on file     Active member of club or organization: Not on file     Attends meetings of clubs or organizations: Not on file     Relationship status: Not on file   Other Topics Concern    Not on file   Social History Narrative    Not on file     Current Outpatient Medications on File Prior to Visit   Medication Sig Dispense Refill    ALPRAZolam (XANAX) 0.25 MG tablet Take 1 tablet (0.25 mg total) by mouth daily as needed for Anxiety. 30 tablet 1    aspirin 81 MG Chew Take 1 tablet (81 mg total) by mouth once daily.  0    cyanocobalamin (VITAMIN B-12) 1000 MCG tablet Take 100 mcg by mouth once daily.      cyclobenzaprine (FLEXERIL) 10 MG tablet TAKE 1 TABLET BY MOUTH QHS 90 tablet 3    evolocumab (REPATHA SYRINGE) 140 mg/mL Syrg Inject 140 mg into the skin every 14 (fourteen) days. 6 Syringe 3    fluticasone (FLONASE) 50  mcg/actuation nasal spray INSTILL 2 SPRAYS IN EACH NOSTRIL ONCE DAILY. 16 g 11    loratadine (CLARITIN) 10 mg tablet Take 10 mg by mouth daily as needed for Allergies.      losartan-hydrochlorothiazide 50-12.5 mg (HYZAAR) 50-12.5 mg per tablet Take 1 tablet by mouth every morning. 30 tablet 11    multivitamin with minerals tablet Take 1 tablet by mouth once daily.      mupirocin calcium 2% (BACTROBAN) 2 % cream Apply topically 2 (two) times daily. 15 g 0    nebivolol (BYSTOLIC) 10 MG Tab Take 1 tablet (10 mg total) by mouth once daily. 30 tablet 11    fluocinolone acetonide oil (DERMOTIC OIL) 0.01 % Drop Place 3 drops in ear(s) 2 (two) times daily. 1 Bottle 3    naproxen (NAPROSYN) 500 MG tablet Take 1 tablet (500 mg total) by mouth 2 (two) times daily. 30 tablet 0    niacin, inositol niacinate, (NIACIN FLUSH FREE) 400 mg niacin (500 mg) Cap Take 500 mg by mouth after dinner. (Patient taking differently: Take 1,000 mg by mouth 2 (two) times daily. 06/13/18 Dr Byers increased to 2000 mg a day.)  0    nicotine (NICODERM CQ) 14 mg/24 hr Place 1 patch onto the skin once daily. 14 patch 1    tamsulosin (FLOMAX) 0.4 mg Cap Take 1 capsule (0.4 mg total) by mouth once daily. 7 capsule 0     No current facility-administered medications on file prior to visit.      Review of patient's allergies indicates:   Allergen Reactions    Cholesterol analogues     Statins-hmg-coa reductase inhibitors Other (See Comments)     Myalgias to lipitor and simvastatin       Objective:     Physical Exam   Constitutional: She is oriented to person, place, and time. She appears well-developed and well-nourished.   HENT:   Head: Normocephalic and atraumatic.   Eyes: Pupils are equal, round, and reactive to light. Conjunctivae and EOM are normal.   Neck: Normal range of motion. Neck supple.   Cardiovascular: Normal rate, regular rhythm, normal heart sounds and intact distal pulses.   Pulmonary/Chest: Effort normal and breath sounds  normal.   Abdominal: Soft. Bowel sounds are normal.   Musculoskeletal: Normal range of motion.   Neurological: She is alert and oriented to person, place, and time.   Skin: Skin is warm and dry.   Psychiatric: She has a normal mood and affect.   Nursing note and vitals reviewed.      Assessment:     1. PAD (peripheral artery disease)    2. Essential hypertension    3. Pure hypercholesterolemia    4. Coronary artery disease involving native coronary artery of native heart without angina pectoris    5. Statin intolerance    6. Tobacco abuse        Plan:     PAD (peripheral artery disease)    Essential hypertension    Pure hypercholesterolemia    Coronary artery disease involving native coronary artery of native heart without angina pectoris    Statin intolerance    Tobacco abuse      exercise  Smoking cessation  Continue bystolic, losartan-hctz-htn  Continue , repatha-hlp      No

## 2023-03-23 ENCOUNTER — EMERGENCY (EMERGENCY)
Facility: HOSPITAL | Age: 78
LOS: 1 days | Discharge: HOME | End: 2023-03-23
Attending: EMERGENCY MEDICINE | Admitting: EMERGENCY MEDICINE
Payer: MEDICARE

## 2023-03-23 VITALS
HEART RATE: 83 BPM | RESPIRATION RATE: 17 BRPM | TEMPERATURE: 99 F | HEIGHT: 68 IN | OXYGEN SATURATION: 100 % | SYSTOLIC BLOOD PRESSURE: 150 MMHG | DIASTOLIC BLOOD PRESSURE: 80 MMHG

## 2023-03-23 DIAGNOSIS — Z98.890 OTHER SPECIFIED POSTPROCEDURAL STATES: Chronic | ICD-10-CM

## 2023-03-23 LAB
ALBUMIN SERPL ELPH-MCNC: 4.7 G/DL — SIGNIFICANT CHANGE UP (ref 3.3–5)
ALP SERPL-CCNC: 63 U/L — SIGNIFICANT CHANGE UP (ref 40–120)
ALT FLD-CCNC: 14 U/L — SIGNIFICANT CHANGE UP (ref 4–41)
ANION GAP SERPL CALC-SCNC: 9 MMOL/L — SIGNIFICANT CHANGE UP (ref 7–14)
APTT BLD: 28.5 SEC — SIGNIFICANT CHANGE UP (ref 27–36.3)
AST SERPL-CCNC: 32 U/L — SIGNIFICANT CHANGE UP (ref 4–40)
BASE EXCESS BLDV CALC-SCNC: 2.4 MMOL/L — SIGNIFICANT CHANGE UP (ref -2–3)
BASOPHILS # BLD AUTO: 0.02 K/UL — SIGNIFICANT CHANGE UP (ref 0–0.2)
BASOPHILS NFR BLD AUTO: 0.2 % — SIGNIFICANT CHANGE UP (ref 0–2)
BILIRUB SERPL-MCNC: 0.8 MG/DL — SIGNIFICANT CHANGE UP (ref 0.2–1.2)
BUN SERPL-MCNC: 12 MG/DL — SIGNIFICANT CHANGE UP (ref 7–23)
CALCIUM SERPL-MCNC: 9.4 MG/DL — SIGNIFICANT CHANGE UP (ref 8.4–10.5)
CHLORIDE SERPL-SCNC: 101 MMOL/L — SIGNIFICANT CHANGE UP (ref 98–107)
CO2 BLDV-SCNC: 30.4 MMOL/L — HIGH (ref 22–26)
CO2 SERPL-SCNC: 28 MMOL/L — SIGNIFICANT CHANGE UP (ref 22–31)
CREAT SERPL-MCNC: 0.93 MG/DL — SIGNIFICANT CHANGE UP (ref 0.5–1.3)
EGFR: 84 ML/MIN/1.73M2 — SIGNIFICANT CHANGE UP
EOSINOPHIL # BLD AUTO: 0.03 K/UL — SIGNIFICANT CHANGE UP (ref 0–0.5)
EOSINOPHIL NFR BLD AUTO: 0.3 % — SIGNIFICANT CHANGE UP (ref 0–6)
GLUCOSE SERPL-MCNC: 97 MG/DL — SIGNIFICANT CHANGE UP (ref 70–99)
HCO3 BLDV-SCNC: 29 MMOL/L — SIGNIFICANT CHANGE UP (ref 22–29)
HCT VFR BLD CALC: 38.9 % — LOW (ref 39–50)
HGB BLD-MCNC: 12.5 G/DL — LOW (ref 13–17)
IANC: 5.89 K/UL — SIGNIFICANT CHANGE UP (ref 1.8–7.4)
IMM GRANULOCYTES NFR BLD AUTO: 0.3 % — SIGNIFICANT CHANGE UP (ref 0–0.9)
INR BLD: 1.09 RATIO — SIGNIFICANT CHANGE UP (ref 0.88–1.16)
LYMPHOCYTES # BLD AUTO: 1.66 K/UL — SIGNIFICANT CHANGE UP (ref 1–3.3)
LYMPHOCYTES # BLD AUTO: 19.1 % — SIGNIFICANT CHANGE UP (ref 13–44)
MCHC RBC-ENTMCNC: 30.3 PG — SIGNIFICANT CHANGE UP (ref 27–34)
MCHC RBC-ENTMCNC: 32.1 GM/DL — SIGNIFICANT CHANGE UP (ref 32–36)
MCV RBC AUTO: 94.2 FL — SIGNIFICANT CHANGE UP (ref 80–100)
MONOCYTES # BLD AUTO: 1.04 K/UL — HIGH (ref 0–0.9)
MONOCYTES NFR BLD AUTO: 12 % — SIGNIFICANT CHANGE UP (ref 2–14)
NEUTROPHILS # BLD AUTO: 5.89 K/UL — SIGNIFICANT CHANGE UP (ref 1.8–7.4)
NEUTROPHILS NFR BLD AUTO: 68.1 % — SIGNIFICANT CHANGE UP (ref 43–77)
NRBC # BLD: 0 /100 WBCS — SIGNIFICANT CHANGE UP (ref 0–0)
NRBC # FLD: 0 K/UL — SIGNIFICANT CHANGE UP (ref 0–0)
PCO2 BLDV: 51 MMHG — SIGNIFICANT CHANGE UP (ref 42–55)
PH BLDV: 7.36 — SIGNIFICANT CHANGE UP (ref 7.32–7.43)
PLATELET # BLD AUTO: 162 K/UL — SIGNIFICANT CHANGE UP (ref 150–400)
PO2 BLDV: 37 MMHG — SIGNIFICANT CHANGE UP (ref 25–45)
POTASSIUM SERPL-MCNC: 4.5 MMOL/L — SIGNIFICANT CHANGE UP (ref 3.5–5.3)
POTASSIUM SERPL-SCNC: 4.5 MMOL/L — SIGNIFICANT CHANGE UP (ref 3.5–5.3)
PROT SERPL-MCNC: 6.8 G/DL — SIGNIFICANT CHANGE UP (ref 6–8.3)
PROTHROM AB SERPL-ACNC: 12.6 SEC — SIGNIFICANT CHANGE UP (ref 10.5–13.4)
RBC # BLD: 4.13 M/UL — LOW (ref 4.2–5.8)
RBC # FLD: 12.9 % — SIGNIFICANT CHANGE UP (ref 10.3–14.5)
SAO2 % BLDV: 59.1 % — LOW (ref 67–88)
SODIUM SERPL-SCNC: 138 MMOL/L — SIGNIFICANT CHANGE UP (ref 135–145)
WBC # BLD: 8.67 K/UL — SIGNIFICANT CHANGE UP (ref 3.8–10.5)
WBC # FLD AUTO: 8.67 K/UL — SIGNIFICANT CHANGE UP (ref 3.8–10.5)

## 2023-03-23 PROCEDURE — 99285 EMERGENCY DEPT VISIT HI MDM: CPT

## 2023-03-23 RX ORDER — SENNA PLUS 8.6 MG/1
3 TABLET ORAL
Qty: 30 | Refills: 0
Start: 2023-03-23 | End: 2023-04-01

## 2023-03-23 RX ORDER — SENNA PLUS 8.6 MG/1
1 TABLET ORAL ONCE
Refills: 0 | Status: DISCONTINUED | OUTPATIENT
Start: 2023-03-23 | End: 2023-03-23

## 2023-03-23 RX ORDER — POLYETHYLENE GLYCOL 3350 17 G/17G
17 POWDER, FOR SOLUTION ORAL ONCE
Refills: 0 | Status: DISCONTINUED | OUTPATIENT
Start: 2023-03-23 | End: 2023-03-23

## 2023-03-23 NOTE — ED ADULT NURSE NOTE - OBJECTIVE STATEMENT
76y/o M s/p R carotid endarectomy on 3/21/23 presents to ED with c/o swelling & ecchymoses to site s/p procedure. Pt also with c/o difficulty swallowing. Denies SOB, n/v/d, fevers, chills, pain. Pt A&Ox4, speaking in clear sentences. No SOB / labored breathing / acute distress noted during assessment.  IV access obtained. Labs collected & sent. Fellow from surgery at bedside examining pt

## 2023-03-23 NOTE — PROGRESS NOTE ADULT - SUBJECTIVE AND OBJECTIVE BOX
Patient in ER w ecchymosis of neck  No swelling or hematoma. Flat.  Incision intact.  No stridor.  Ok to DC home w follow up in office in 2 weeks.

## 2023-03-23 NOTE — ED PROVIDER NOTE - CARE PROVIDER_API CALL
Blaine Swain)  Surgery; Vascular Surgery  [phone] [place] [address]  Phone: [phone]  Fax: [phone]  Follow Up Time:

## 2023-03-23 NOTE — CONSULT NOTE ADULT - ASSESSMENT
77M hx 90% RCA occlusion, past TIA 2018 with no residual deficits, CAD, GERD, HLD, HTN, left leg sciatica, s/p R CEA on 3/21, discharged 3/22, p/w concern for swelling.    Recommendations:  - No acute surgical intervention, physical exam consistent with postoperative changes  - Dispo per ED    D/w fellow on behalf of attending.    AGATHA Santiago, PGY-2  C Team Surgery  #[id number]   77M hx 90% RCA occlusion, past TIA 2018 with no residual deficits, CAD, GERD, HLD, HTN, left leg sciatica, s/p R CEA on 3/21, discharged 3/22, p/w concern for swelling.    Recommendations:  - No acute surgical intervention, physical exam consistent with postoperative changes  - Dispo per ED  - Patient to follow up with Dr. Swain outpatient     D/w fellow on behalf of attending.    AGATHA Santiago, PGY-2  C Team Surgery  #[id number]

## 2023-03-23 NOTE — ED PROVIDER NOTE - NSFOLLOWUPINSTRUCTIONS_ED_ALL_ED_FT
Please follow up with Dr. Swain in the office within 1 week. Please continue all medications as prescribed by your primary care doctor.     Return to ER if you have difficulty swallowing, shortness of breath, chest pain, or any concerns.

## 2023-03-23 NOTE — ED PROVIDER NOTE - OBJECTIVE STATEMENT
Attending/Damian; 77 M h/o HTN, CAD, carotid stenosis s/p Rt CEA on 3/21/23, discharged yesterday and this morning, after taking a shower notices submandibular swelling which has progressively worsening during the course of the day. He reports SOB, difficulty swelling. Attending/Damian; 77 M h/o HTN, CAD, carotid stenosis s/p Rt CEA on 3/21/23, discharged yesterday and this morning, after taking a shower notices submandibular swelling which has progressively worsening during the course of the day. He reports SOB, difficulty swelling.    AMOL Hernandez: 76 y/o M PMHx above presents with c/o Attending/Damian; 77 M h/o HTN, CAD, carotid stenosis s/p Rt CEA on 3/21/23, discharged yesterday and this morning, after taking a shower notices submandibular swelling which has progressively worsening during the course of the day. He reports SOB, difficulty swelling.    AMOL Hernandez: 78 y/o M PMHx above presents with c/o R sided neck swelling and ecchymosis after surgery on 3/21. Denies SOB, chest pain, inability to swallow.

## 2023-03-23 NOTE — ED PROVIDER NOTE - NS ED ATTENDING STATEMENT MOD
I have seen and examined this patient and fully participated in the care of this patient as the teaching attending.  The service was shared with the ROBYN.  I reviewed and verified the documentation and independently performed the documented:

## 2023-03-23 NOTE — ED PROVIDER NOTE - PATIENT PORTAL LINK FT
You can access the FollowMyHealth Patient Portal offered by [facility] by registering at the following website: [email or web address]. By joining [facility]’s FollowMyHealth portal, you will also be able to view your health information using other applications (apps) compatible with our system.

## 2023-03-23 NOTE — ED ADULT TRIAGE NOTE - CHIEF COMPLAINT QUOTE
Pt s/p right carotid endarterectomy and was discharged yesterday. This morning patient woke up and noted to be swollen with a new mass under chin. Ecchymosis noted to surgical site and lower neck. Patient c/o of difficulty swallowing. Able to tolerate secretions and no denies voice changes and SOB. Patient states they called their cardiologist and was urged to come to the ER. Hx CAD, carotid stenosis, TIA, HTN. On plavix. Pt s/p right carotid endarterectomy and was discharged yesterday. This morning patient woke up and noted to be swollen with a new mass under chin. Ecchymosis noted to surgical site and lower neck. Patient c/o of difficulty swallowing. Able to tolerate secretions and denies voice changes and SOB. Patient states they called their cardiologist and was urged to come to the ER. Hx CAD, carotid stenosis, TIA, HTN. On plavix.

## 2023-03-23 NOTE — ED PROVIDER NOTE - CLINICAL SUMMARY MEDICAL DECISION MAKING FREE TEXT BOX
A/P 76 yo M with h/o CAD, HTN< carotid stenosis, s/o Rt CEA now p/w symptomatic neck swelling. exam noted for intact surgical site with submandibular swelling, no evidence of airway compromise, neuro intact  Plan: vasc surg consult, CT angio, labs, reassess  Relevant EMR reviewed A/P 76 yo M with h/o CAD, HTN< carotid stenosis, s/o Rt CEA now p/w symptomatic neck swelling. exam noted for intact surgical site with submandibular swelling, no evidence of airway compromise, neuro intact    Relevant EMR reviewed    Dr. Swain evaluated patient and does not recommend any further interventions. Reports exam c/w post-surgical changes. Will follow up with patient in the office

## 2023-03-23 NOTE — ED PROVIDER NOTE - PHYSICAL EXAMINATION
Attending/Damian: NAD; PERRL/EOMI, O/P-no pooling of secretions, RT caortid surgical site-C/D/I post op ecchymosis, no stridor, no JVD, RRR, CTAB; Abd-soft, NT/ND, no HSM; no LE edema, A&Ox3, nonfocal; Skin-warm/dry

## 2023-03-23 NOTE — ED ADULT TRIAGE NOTE - CCCP TRG CHIEF CMPLNT
Transitions of Care Call  Call within 2 business days of discharge: Yes    Patient: Bre Tilley Patient : 1950   MRN: [id number]  Reason for Admission: [place]  Discharge Date: 22 RARS: Readmission Risk Score: 14.8 ( )      Last Discharge [facility]       Complaint Diagnosis Description Type Department Provider    22 Shortness of Breath Acute respiratory failure with hypoxia ([address] . .. ED to Hosp-Admission (Discharged) (ADMITTED) Vicky Yeung MD; Rico Haro MD;... Was this an external facility discharge? No Discharge Facility: [facility]      Challenges to be reviewed by the provider   Additional needs identified to be addressed with provider: No  none                 Encounter was not routed to provider for escalation. Method of communication with provider: none. Discussed COVID-19 related testing which was: available at this time. Test results were: positive. Patient informed of results, if available? Yes. Current Symptoms: cough, shortness of breath and no new symptoms    Reviewed New or Changed Meds: yes    Do you have what you need at home?  Durable Medical Equipment ordered at discharge: None   Home Health/Outpatient orders at discharge: none   Was patient discharged with a pulse oximeter? No Discussed and confirmed pulse oximeter discharge instructions and when to notify provider or seek emergency care. Patient education provided: Reviewed appropriate site of care based on symptoms and resources available to patient including: PCP, Specialist and When to call 911. Follow up appointment scheduled within 7 days of discharge: yes.  If no appointment scheduled, scheduling offered: yes  Future Appointments   Date Time Provider Naveen Funes   10/10/2022  1:30 PM Kaylah Noguera MD [facility]       Interventions: Obtained and reviewed discharge summary and/or continuity of care documents  Education of patient/family/caregiver/guardian to support
post operative complication

## 2023-03-23 NOTE — ED ADULT NURSE NOTE - CHIEF COMPLAINT QUOTE
Pt s/p right carotid endarterectomy and was discharged yesterday. This morning patient woke up and noted to be swollen with a new mass under chin. Ecchymosis noted to surgical site and lower neck. Patient c/o of difficulty swallowing. Able to tolerate secretions and denies voice changes and SOB. Patient states they called their cardiologist and was urged to come to the ER. Hx CAD, carotid stenosis, TIA, HTN. On plavix.

## 2023-03-23 NOTE — CONSULT NOTE ADULT - SUBJECTIVE AND OBJECTIVE BOX
HPI:  77M hx 90% RCA occlusion, past TIA 2018 with no residual deficits, CAD, GERD, HLD, HTN, left leg sciatica, s/p R CEA on 3/21, discharged 3/22, p/w concern for swelling. Patient reports submandibular swelling, difficulty swallowing. Was told to come into ED by cardiologist. In ED, pt. hemodynamically stable.     PAST MEDICAL & SURGICAL HISTORY:  HTN (hypertension)      HLD (hyperlipidemia)      CAD (coronary artery disease)      GERD (gastroesophageal reflux disease)      H/O carotid stenosis      Lumbar disc disorder      History of TIA (transient ischemic attack)      H/O hernia repair          MEDICATIONS  (STANDING):    MEDICATIONS  (PRN):      Allergies    No Known Allergies    Intolerances        SOCIAL HISTORY:    FAMILY HISTORY:          Physical Exam:  General: NAD, resting comfortably  HEENT: NC/AT, EOMI; ecchymotic changes around surgical site consistent with postoperative changes.   Pulmonary: normal resp effort  Cardiovascular: RRR  Abdominal: soft, ND/NT  Extremities: WWP, normal strength  Neuro: A/O x 3, CNs II-XII grossly intact, normal sensation, no focal deficits      Vital Signs Last 24 Hrs  T(C): 37.2 (23 Mar 2023 14:19), Max: 37.2 (23 Mar 2023 14:19)  T(F): 98.9 (23 Mar 2023 14:19), Max: 98.9 (23 Mar 2023 14:19)  HR: 83 (23 Mar 2023 14:19) (83 - 83)  BP: 150/80 (23 Mar 2023 14:19) (150/80 - 150/80)  BP(mean): --  RR: 17 (23 Mar 2023 14:19) (17 - 17)  SpO2: 100% (23 Mar 2023 14:19) (100% - 100%)    Parameters below as of 23 Mar 2023 14:19  Patient On (Oxygen Delivery Method): room air        I&O's Summary          LABS:                        11.6   8.37  )-----------( 136      ( 22 Mar 2023 07:00 )             35.6     03-22    140  |  106  |  16  ----------------------------<  138<H>  4.4   |  25  |  0.99    Ca    8.6      22 Mar 2023 07:00          CAPILLARY BLOOD GLUCOSE            Cultures:      RADIOLOGY & ADDITIONAL STUDIES:           HPI:  77M hx 90% RCA occlusion, past TIA 2018 with no residual deficits, CAD, GERD, HLD, HTN, left leg sciatica, s/p R CEA on 3/21, discharged 3/22, p/w concern for swelling. Patient reports submandibular swelling, difficulty swallowing. Was told to come into ED by cardiologist. In ED, pt. hemodynamically stable.     PAST MEDICAL & SURGICAL HISTORY:  HTN (hypertension)      HLD (hyperlipidemia)      CAD (coronary artery disease)      GERD (gastroesophageal reflux disease)      H/O carotid stenosis      Lumbar disc disorder      History of TIA (transient ischemic attack)      H/O hernia repair          MEDICATIONS  (STANDING):    MEDICATIONS  (PRN):      Allergies    No Known Allergies    Intolerances        SOCIAL HISTORY:    FAMILY HISTORY:          Physical Exam:  General: NAD, resting comfortably  HEENT: NC/AT, EOMI; ecchymotic changes in neck, incision intact, no palpable masses or hematomas appreciated  Pulmonary: normal resp effort  Cardiovascular: RRR  Abdominal: soft, ND/NT  Extremities: WWP, normal strength  Neuro: A/O x 3, CNs II-XII grossly intact, normal sensation, no focal deficits      Vital Signs Last 24 Hrs  T(C): 37.2 (23 Mar 2023 14:19), Max: 37.2 (23 Mar 2023 14:19)  T(F): 98.9 (23 Mar 2023 14:19), Max: 98.9 (23 Mar 2023 14:19)  HR: 83 (23 Mar 2023 14:19) (83 - 83)  BP: 150/80 (23 Mar 2023 14:19) (150/80 - 150/80)  BP(mean): --  RR: 17 (23 Mar 2023 14:19) (17 - 17)  SpO2: 100% (23 Mar 2023 14:19) (100% - 100%)    Parameters below as of 23 Mar 2023 14:19  Patient On (Oxygen Delivery Method): room air        I&O's Summary          LABS:                        11.6   8.37  )-----------( 136      ( 22 Mar 2023 07:00 )             35.6     03-22    140  |  106  |  16  ----------------------------<  138<H>  4.4   |  25  |  0.99    Ca    8.6      22 Mar 2023 07:00          CAPILLARY BLOOD GLUCOSE            Cultures:      RADIOLOGY & ADDITIONAL STUDIES:

## 2023-03-23 NOTE — ED PROVIDER NOTE - PROGRESS NOTE DETAILS
Damian: Surgery contacted for urgent vascular consult. Damian: Surgery contacted for urgent vascular consult.     Dr. Swain came to evaluate patient at bedside, reports exam consistent with normal post-surgical changes. Does not advise any further interventions in the ED, states he will f/u with patient in his office. Family and patient made aware of dispo planning, pending lab results.

## 2023-03-23 NOTE — ED PROVIDER NOTE - NSICDXPASTSURGICALHX_GEN_ALL_CORE_FT
PAST SURGICAL HISTORY:  H/O hernia repair      PAST SURGICAL HISTORY:  H/O carotid endarterectomy     H/O hernia repair

## 2023-03-30 ENCOUNTER — APPOINTMENT (OUTPATIENT)
Dept: INTERNAL MEDICINE | Facility: CLINIC | Age: 78
End: 2023-03-30

## 2023-03-30 LAB — SURGICAL PATHOLOGY STUDY: SIGNIFICANT CHANGE UP

## 2023-04-06 ENCOUNTER — APPOINTMENT (OUTPATIENT)
Dept: VASCULAR SURGERY | Facility: CLINIC | Age: 78
End: 2023-04-06
Payer: MEDICARE

## 2023-04-06 VITALS
WEIGHT: 213 LBS | BODY MASS INDEX: 33.43 KG/M2 | DIASTOLIC BLOOD PRESSURE: 72 MMHG | HEIGHT: 67 IN | SYSTOLIC BLOOD PRESSURE: 122 MMHG | HEART RATE: 62 BPM | TEMPERATURE: 97.8 F

## 2023-04-06 VITALS — HEART RATE: 62 BPM | SYSTOLIC BLOOD PRESSURE: 121 MMHG | DIASTOLIC BLOOD PRESSURE: 59 MMHG

## 2023-04-06 PROCEDURE — 99024 POSTOP FOLLOW-UP VISIT: CPT

## 2023-04-06 NOTE — PHYSICAL EXAM
[2+] : left 2+ [Calm] : calm [de-identified] : NAD [de-identified] : R CEA c/d/i and healing well with minimal scabbing, no neck swelling or tenderness, no signs of infection [de-identified] : unlabored [de-identified] : dark purple-green ecchymosis across chest appears to be resolving [de-identified] : grossly normal

## 2023-04-06 NOTE — HISTORY OF PRESENT ILLNESS
[FreeTextEntry1] : \par \par CRUZ MASSEY (: Jul  3 1945) is a 77 year old male who presents today for post-op visit. \par \par He is s/p R CEA (3/21/2023). He has a history of stroke which occurred about 3 years ago. No residual deficits. Uncomplicated recovery from CEA. He presented to [facility] ER on post-op day 2 due to bruising. \par \par Of note, pre-op workup noted with severe stenosis of bilateral carotid arteries. \par \par Today he reports feeling better. States neck swelling and bruising is much better.\par Denies fever, chills, pain, swelling, numbness.\par Denies focal neurologic deficits including amaurosis fugax, slurred speech, and weakness in the arms/legs.\par Complaint with aspirin, Plavix, and atorvastatin. \par \par PMH: HTN, HLD, stroke\par

## 2023-04-06 NOTE — ASSESSMENT
[FreeTextEntry1] : A/P\par \par Doing well s/p R CEA. Continue with antiplatelet and statin. Follow up in 2 week for post-op carotid duplex. Will need to consider L carotid reintervention (CEA versus TCAR).

## 2023-04-18 ENCOUNTER — APPOINTMENT (OUTPATIENT)
Dept: INTERNAL MEDICINE | Facility: CLINIC | Age: 78
End: 2023-04-18
Payer: MEDICARE

## 2023-04-18 VITALS
WEIGHT: 206 LBS | SYSTOLIC BLOOD PRESSURE: 124 MMHG | DIASTOLIC BLOOD PRESSURE: 64 MMHG | HEART RATE: 56 BPM | TEMPERATURE: 97.4 F | OXYGEN SATURATION: 97 % | RESPIRATION RATE: 14 BRPM | BODY MASS INDEX: 32.26 KG/M2

## 2023-04-18 DIAGNOSIS — M54.50 LOW BACK PAIN, UNSPECIFIED: ICD-10-CM

## 2023-04-18 PROCEDURE — 99214 OFFICE O/P EST MOD 30 MIN: CPT

## 2023-04-20 ENCOUNTER — APPOINTMENT (OUTPATIENT)
Dept: VASCULAR SURGERY | Facility: CLINIC | Age: 78
End: 2023-04-20
Payer: MEDICARE

## 2023-04-20 VITALS — SYSTOLIC BLOOD PRESSURE: 143 MMHG | DIASTOLIC BLOOD PRESSURE: 78 MMHG | HEART RATE: 52 BPM

## 2023-04-20 VITALS
HEART RATE: 53 BPM | DIASTOLIC BLOOD PRESSURE: 69 MMHG | HEIGHT: 67 IN | TEMPERATURE: 98 F | SYSTOLIC BLOOD PRESSURE: 143 MMHG | BODY MASS INDEX: 32.33 KG/M2 | WEIGHT: 206 LBS

## 2023-04-20 PROCEDURE — 93880 EXTRACRANIAL BILAT STUDY: CPT

## 2023-04-20 PROCEDURE — 99024 POSTOP FOLLOW-UP VISIT: CPT

## 2023-04-21 NOTE — PHYSICAL EXAM
[2+] : left 2+ [Calm] : calm [de-identified] : NAD [de-identified] : R CEA well healed [de-identified] : unlabored [de-identified] : grossly normal

## 2023-04-21 NOTE — ASSESSMENT
[FreeTextEntry1] : \par A/P\par \par Doing well s/p R CEA. High grade L carotid artery stenosis on pre-op CTA and ultrasound. Duplex today with LICA velocities that correspond to moderate range, however, higher degree of stenosis not ruled out due to presence of significant acoustic shadowing. \par \par Recommend L carotid revascularization to reduce risk of stroke/TIA. L CCA is too short and will not accommodate sheath necessary for TCAR. Advise L CEA. R/B/A discussed. All questions/concerns addressed. Patient is agreeable to the plan. He will need to obtain cardiac clearance and PST for L CEA.

## 2023-04-21 NOTE — HISTORY OF PRESENT ILLNESS
[FreeTextEntry1] : \par CRUZ MASSEY (: Jul  3 1945) is a 77 year old male who presents today for post-op studies.  \par \par He is s/p R CEA (3/21/2023). He has a history of stroke which occurred about 3 years ago. No residual deficits. \par \par Today he has no new complaints. Reports significant improvement in bruising. \par Denies focal neurologic deficits including amaurosis fugax, slurred speech, and weakness in the arms/legs.\par Complaint with aspirin, Plavix, and atorvastatin. \par \par PMH: HTN, HLD, stroke\par \par 2023\par Post-op duplex today demonstrates a patent R carotid artery s/p CEA without recurrent stenosis. \par L ICA with velocities of 229/56 (50-69% stenosis), noted with fibrocalcific plaque with significant acoustic shadowing. \par \par Previous studies:\par CTA (MSR): >90% CJ, >70% LICA\par Duplex 2022 Bilateral high grade stenosis /113, LICA 437/62

## 2023-05-04 ENCOUNTER — OUTPATIENT (OUTPATIENT)
Dept: OUTPATIENT SERVICES | Facility: HOSPITAL | Age: 78
LOS: 1 days | End: 2023-05-04

## 2023-05-04 VITALS
OXYGEN SATURATION: 97 % | RESPIRATION RATE: 16 BRPM | WEIGHT: 203.93 LBS | DIASTOLIC BLOOD PRESSURE: 68 MMHG | HEIGHT: 67.5 IN | SYSTOLIC BLOOD PRESSURE: 111 MMHG | HEART RATE: 51 BPM | TEMPERATURE: 97 F

## 2023-05-04 DIAGNOSIS — I65.23 OCCLUSION AND STENOSIS OF BILATERAL CAROTID ARTERIES: ICD-10-CM

## 2023-05-04 DIAGNOSIS — Z98.890 OTHER SPECIFIED POSTPROCEDURAL STATES: Chronic | ICD-10-CM

## 2023-05-04 DIAGNOSIS — I10 ESSENTIAL (PRIMARY) HYPERTENSION: ICD-10-CM

## 2023-05-04 DIAGNOSIS — I65.29 OCCLUSION AND STENOSIS OF UNSPECIFIED CAROTID ARTERY: ICD-10-CM

## 2023-05-04 DIAGNOSIS — Z86.73 PERSONAL HISTORY OF TRANSIENT ISCHEMIC ATTACK (TIA), AND CEREBRAL INFARCTION WITHOUT RESIDUAL DEFICITS: ICD-10-CM

## 2023-05-04 LAB
ANION GAP SERPL CALC-SCNC: 10 MMOL/L — SIGNIFICANT CHANGE UP (ref 7–14)
BLD GP AB SCN SERPL QL: NEGATIVE — SIGNIFICANT CHANGE UP
BUN SERPL-MCNC: 13 MG/DL — SIGNIFICANT CHANGE UP (ref 7–23)
CALCIUM SERPL-MCNC: 9.7 MG/DL — SIGNIFICANT CHANGE UP (ref 8.4–10.5)
CHLORIDE SERPL-SCNC: 104 MMOL/L — SIGNIFICANT CHANGE UP (ref 98–107)
CO2 SERPL-SCNC: 28 MMOL/L — SIGNIFICANT CHANGE UP (ref 22–31)
CREAT SERPL-MCNC: 0.98 MG/DL — SIGNIFICANT CHANGE UP (ref 0.5–1.3)
EGFR: 78 ML/MIN/1.73M2 — SIGNIFICANT CHANGE UP
GLUCOSE SERPL-MCNC: 93 MG/DL — SIGNIFICANT CHANGE UP (ref 70–99)
HCT VFR BLD CALC: 42.8 % — SIGNIFICANT CHANGE UP (ref 39–50)
HGB BLD-MCNC: 14.5 G/DL — SIGNIFICANT CHANGE UP (ref 13–17)
MCHC RBC-ENTMCNC: 31.6 PG — SIGNIFICANT CHANGE UP (ref 27–34)
MCHC RBC-ENTMCNC: 33.9 GM/DL — SIGNIFICANT CHANGE UP (ref 32–36)
MCV RBC AUTO: 93.2 FL — SIGNIFICANT CHANGE UP (ref 80–100)
NRBC # BLD: 0 /100 WBCS — SIGNIFICANT CHANGE UP (ref 0–0)
NRBC # FLD: 0 K/UL — SIGNIFICANT CHANGE UP (ref 0–0)
PLATELET # BLD AUTO: 171 K/UL — SIGNIFICANT CHANGE UP (ref 150–400)
POTASSIUM SERPL-MCNC: 4.3 MMOL/L — SIGNIFICANT CHANGE UP (ref 3.5–5.3)
POTASSIUM SERPL-SCNC: 4.3 MMOL/L — SIGNIFICANT CHANGE UP (ref 3.5–5.3)
RBC # BLD: 4.59 M/UL — SIGNIFICANT CHANGE UP (ref 4.2–5.8)
RBC # FLD: 12.6 % — SIGNIFICANT CHANGE UP (ref 10.3–14.5)
RH IG SCN BLD-IMP: POSITIVE — SIGNIFICANT CHANGE UP
SODIUM SERPL-SCNC: 142 MMOL/L — SIGNIFICANT CHANGE UP (ref 135–145)
WBC # BLD: 5.58 K/UL — SIGNIFICANT CHANGE UP (ref 3.8–10.5)
WBC # FLD AUTO: 5.58 K/UL — SIGNIFICANT CHANGE UP (ref 3.8–10.5)

## 2023-05-04 RX ORDER — SODIUM CHLORIDE 9 MG/ML
1000 INJECTION, SOLUTION INTRAVENOUS
Refills: 0 | Status: DISCONTINUED | OUTPATIENT
Start: 2023-05-16 | End: 2023-05-17

## 2023-05-04 RX ORDER — GABAPENTIN 400 MG/1
1 CAPSULE ORAL
Qty: 0 | Refills: 0 | DISCHARGE

## 2023-05-04 RX ORDER — QUINAPRIL HYDROCHLORIDE 40 MG/1
1 TABLET, FILM COATED ORAL
Qty: 0 | Refills: 0 | DISCHARGE

## 2023-05-04 NOTE — H&P PST ADULT - PROBLEM SELECTOR PLAN 3
Pt on plavix & low dose aspirin.  Preop instruction as per Dr Swain Pt on plavix & low dose aspirin.  Preop instruction as per Dr Swain.

## 2023-05-04 NOTE — H&P PST ADULT - PROBLEM SELECTOR PLAN 1
Scheduled left carotid endarterectomy with EEG monitoring   Written & verbal preop instructions, gi prophylaxis & surgical soap given  Pt verbalized good understanding.  Teach back done on surgical soap instructions.  Pt instructed to take gabapentin on morning of surgery.  BRENDA precautions recommended.   cbc, bmp, t&s done

## 2023-05-04 NOTE — H&P PST ADULT - CARDIOVASCULAR COMMENTS
Preop dx occlusion & stenosis b/l carotid arteries carotid stenosis - refer to hpi, h/o TIA no residual effect - on plavix & low dose aspirin

## 2023-05-04 NOTE — H&P PST ADULT - BLOOD TRANSFUSION, PREVIOUS, PROFILE
IBW 52.3Kg  %IBW 97%  BMI 19.9    Utilized IBW to calculate needs, pt >120% of IBW. Adjusted fro post-op/vent. no

## 2023-05-04 NOTE — H&P PST ADULT - HISTORY OF PRESENT ILLNESS
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 Pt is poor communicator of MH   Pt given information for COVID PCR testing sites and told to make appointment 3-5 days preop   76 y/o Fijian speaking male with  PMHx TIA 2018 with no residual deficits,  CAD, GERD, HLD, HTN and lumbar disc disorder with left leg sciatic.  Pt presents for preop eval for  scheduled left carotid endarterectomy with EEG monitoring.  S/p Right Carotid Endarterectomy with EEG with Dr Swain tentatively 3/21/23.  H/o b/l carotid stenosis.

## 2023-05-15 ENCOUNTER — TRANSCRIPTION ENCOUNTER (OUTPATIENT)
Age: 78
End: 2023-05-15

## 2023-05-15 NOTE — ASU PATIENT PROFILE, ADULT - FALL HARM RISK - FALL HARM RISK
Telephone Encounter by Drea Pérez RN at 11/09/18 08:07 AM     Author:  Drea Pérez RN Service:  (none) Author Type:  Registered Nurse     Filed:  11/09/18 08:08 AM Encounter Date:  11/9/2018 Status:  Signed     :  Drea Pérez RN (Registered Nurse)            Authorizing Provider  Shonda Jones MD   [id number]            Associated Diagnoses           Snoring [R06.83]  - Primary                    Order Questions           Question  Answer Comment      Time frame to be seen  Next available       Problem to be seen for?  sleep study/snoring       Do you want a consultation (evaluation and recommendations) or do you want to transfer all care for the indicated medical problem to the requested department/provider?  Consult - The ordering provider is requesting that you evaluate the patient for the problem indicated and provide an opinion or advice regarding the need for further evaluation and/or management of the specified problem.[GM1.1C]       Dr Laughlin on call[GM1.1M]  Electronically Signed by:    Drea Pérez RN , 11/9/2018[GM1.1T]        Revision History        User Key Date/Time User Provider Type Action    > GM1.1 11/09/18 08:08 AM Drea Pérez RN Registered Nurse Sign    C - Copied, M - Manual, T - Template            
Telephone Encounter by Dyan Limon at 11/09/18 09:41 AM     Author:  Dyan Limon Service:  (none) Author Type:       Filed:  11/09/18 09:42 AM Encounter Date:  11/9/2018 Status:  Signed     :  Dyan Limon ()            Left message on home # to sched Apnea Consult, on call.[SR1.1M]      Revision History        User Key Date/Time User Provider Type Action    > SR1.1 11/09/18 09:42 AM Dyan Limon  Sign    M - Manual            
Telephone Encounter by Dyan Limon at 11/15/18 10:44 AM     Author:  Dyan Limon Service:  (none) Author Type:       Filed:  11/15/18 10:44 AM Encounter Date:  11/9/2018 Status:  Signed     :  Dyan Limon ()            Left message on home #.[SR1.1M]      Revision History        User Key Date/Time User Provider Type Action    > SR1.1 11/15/18 10:44 AM Dyan Limon  Sign    M - Manual            
Telephone Encounter by Yolanda Gaines at 11/16/18 11:56 AM     Author:  Yolanda Gaines Service:  (none) Author Type:       Filed:  11/16/18 11:56 AM Encounter Date:  11/9/2018 Status:  Signed     :  Yolanda Gaines ()            Patient scheduled[AS1.1M]       Revision History        User Key Date/Time User Provider Type Action    > AS1.1 11/16/18 11:56 AM Yolanda Gaines  Sign    M - Manual            
cane use

## 2023-05-15 NOTE — ASU PATIENT PROFILE, ADULT - NSICDXPASTMEDICALHX_GEN_ALL_CORE_FT
----- Message from Lopez Orr sent at 6/14/2017  4:47 PM EDT -----  Regarding: Dr. Rocky Issa telephone   Contact: [phone]  Pt is requesting for a referral for back specialist . The best contact number is [phone] PAST MEDICAL HISTORY:  CAD (coronary artery disease)     GERD (gastroesophageal reflux disease)     H/O carotid stenosis     History of TIA (transient ischemic attack)     HLD (hyperlipidemia)     HTN (hypertension)     Lumbar disc disorder

## 2023-05-15 NOTE — ASU PATIENT PROFILE, ADULT - FALL HARM RISK - HARM RISK INTERVENTIONS

## 2023-05-16 ENCOUNTER — APPOINTMENT (OUTPATIENT)
Dept: VASCULAR SURGERY | Facility: HOSPITAL | Age: 78
End: 2023-05-16

## 2023-05-16 ENCOUNTER — INPATIENT (INPATIENT)
Facility: HOSPITAL | Age: 78
LOS: 0 days | Discharge: HOME | End: 2023-05-17
Attending: SURGERY | Admitting: SURGERY
Payer: MEDICARE

## 2023-05-16 ENCOUNTER — RESULT REVIEW (OUTPATIENT)
Age: 78
End: 2023-05-16

## 2023-05-16 VITALS
HEART RATE: 59 BPM | TEMPERATURE: 98 F | RESPIRATION RATE: 16 BRPM | HEIGHT: 67.5 IN | SYSTOLIC BLOOD PRESSURE: 145 MMHG | OXYGEN SATURATION: 98 % | DIASTOLIC BLOOD PRESSURE: 69 MMHG | WEIGHT: 203.93 LBS

## 2023-05-16 DIAGNOSIS — Z98.890 OTHER SPECIFIED POSTPROCEDURAL STATES: Chronic | ICD-10-CM

## 2023-05-16 DIAGNOSIS — I65.23 OCCLUSION AND STENOSIS OF BILATERAL CAROTID ARTERIES: ICD-10-CM

## 2023-05-16 LAB
GAS PNL BLDA: SIGNIFICANT CHANGE UP

## 2023-05-16 PROCEDURE — 88311 DECALCIFY TISSUE: CPT | Mod: 26

## 2023-05-16 PROCEDURE — 35301 RECHANNELING OF ARTERY: CPT | Mod: LT,79

## 2023-05-16 PROCEDURE — 88304 TISSUE EXAM BY PATHOLOGIST: CPT | Mod: 26

## 2023-05-16 DEVICE — KIT A-LINE 1LUM 20G X 12CM SAFE KIT: Type: IMPLANTABLE DEVICE | Site: LEFT | Status: FUNCTIONAL

## 2023-05-16 DEVICE — PATCH CAROTID UT 8X75MM: Type: IMPLANTABLE DEVICE | Site: LEFT | Status: FUNCTIONAL

## 2023-05-16 DEVICE — SURGIFOAM PAD 8CM X 12.5CM X 2MM (100C): Type: IMPLANTABLE DEVICE | Site: LEFT | Status: FUNCTIONAL

## 2023-05-16 RX ORDER — SODIUM CHLORIDE 9 MG/ML
500 INJECTION, SOLUTION INTRAVENOUS ONCE
Refills: 0 | Status: COMPLETED | OUTPATIENT
Start: 2023-05-16 | End: 2023-05-16

## 2023-05-16 RX ORDER — LISINOPRIL 2.5 MG/1
40 TABLET ORAL DAILY
Refills: 0 | Status: DISCONTINUED | OUTPATIENT
Start: 2023-05-16 | End: 2023-05-17

## 2023-05-16 RX ORDER — ACETAMINOPHEN 500 MG
1000 TABLET ORAL ONCE
Refills: 0 | Status: COMPLETED | OUTPATIENT
Start: 2023-05-16 | End: 2023-05-16

## 2023-05-16 RX ORDER — OMEPRAZOLE 10 MG/1
1 CAPSULE, DELAYED RELEASE ORAL
Refills: 0 | DISCHARGE

## 2023-05-16 RX ORDER — GABAPENTIN 400 MG/1
1 CAPSULE ORAL
Refills: 0 | DISCHARGE

## 2023-05-16 RX ORDER — RAMIPRIL 5 MG
1 CAPSULE ORAL
Refills: 0 | DISCHARGE

## 2023-05-16 RX ORDER — OXYCODONE HYDROCHLORIDE 5 MG/1
2.5 TABLET ORAL EVERY 4 HOURS
Refills: 0 | Status: DISCONTINUED | OUTPATIENT
Start: 2023-05-16 | End: 2023-05-17

## 2023-05-16 RX ORDER — HYDROMORPHONE HYDROCHLORIDE 2 MG/ML
0.5 INJECTION INTRAMUSCULAR; INTRAVENOUS; SUBCUTANEOUS
Refills: 0 | Status: DISCONTINUED | OUTPATIENT
Start: 2023-05-16 | End: 2023-05-16

## 2023-05-16 RX ORDER — GABAPENTIN 400 MG/1
300 CAPSULE ORAL
Refills: 0 | Status: DISCONTINUED | OUTPATIENT
Start: 2023-05-16 | End: 2023-05-17

## 2023-05-16 RX ORDER — OXYCODONE HYDROCHLORIDE 5 MG/1
5 TABLET ORAL EVERY 4 HOURS
Refills: 0 | Status: DISCONTINUED | OUTPATIENT
Start: 2023-05-16 | End: 2023-05-17

## 2023-05-16 RX ORDER — ASPIRIN/CALCIUM CARB/MAGNESIUM 324 MG
81 TABLET ORAL DAILY
Refills: 0 | Status: DISCONTINUED | OUTPATIENT
Start: 2023-05-16 | End: 2023-05-17

## 2023-05-16 RX ORDER — METOPROLOL TARTRATE 50 MG
50 TABLET ORAL DAILY
Refills: 0 | Status: DISCONTINUED | OUTPATIENT
Start: 2023-05-16 | End: 2023-05-17

## 2023-05-16 RX ORDER — ASPIRIN/CALCIUM CARB/MAGNESIUM 324 MG
1 TABLET ORAL
Qty: 0 | Refills: 0 | DISCHARGE

## 2023-05-16 RX ORDER — ONDANSETRON 8 MG/1
4 TABLET, FILM COATED ORAL ONCE
Refills: 0 | Status: DISCONTINUED | OUTPATIENT
Start: 2023-05-16 | End: 2023-05-16

## 2023-05-16 RX ORDER — METOPROLOL TARTRATE 50 MG
1 TABLET ORAL
Qty: 0 | Refills: 0 | DISCHARGE

## 2023-05-16 RX ORDER — ATORVASTATIN CALCIUM 80 MG/1
40 TABLET, FILM COATED ORAL AT BEDTIME
Refills: 0 | Status: DISCONTINUED | OUTPATIENT
Start: 2023-05-16 | End: 2023-05-17

## 2023-05-16 RX ORDER — ESCITALOPRAM OXALATE 10 MG/1
1 TABLET, FILM COATED ORAL
Qty: 0 | Refills: 0 | DISCHARGE

## 2023-05-16 RX ORDER — SODIUM CHLORIDE 9 MG/ML
1000 INJECTION, SOLUTION INTRAVENOUS
Refills: 0 | Status: DISCONTINUED | OUTPATIENT
Start: 2023-05-16 | End: 2023-05-17

## 2023-05-16 RX ORDER — ESCITALOPRAM OXALATE 10 MG/1
10 TABLET, FILM COATED ORAL AT BEDTIME
Refills: 0 | Status: DISCONTINUED | OUTPATIENT
Start: 2023-05-16 | End: 2023-05-17

## 2023-05-16 RX ORDER — CLOPIDOGREL BISULFATE 75 MG/1
75 TABLET, FILM COATED ORAL DAILY
Refills: 0 | Status: DISCONTINUED | OUTPATIENT
Start: 2023-05-16 | End: 2023-05-17

## 2023-05-16 RX ORDER — HYDROMORPHONE HYDROCHLORIDE 2 MG/ML
1 INJECTION INTRAMUSCULAR; INTRAVENOUS; SUBCUTANEOUS
Refills: 0 | Status: DISCONTINUED | OUTPATIENT
Start: 2023-05-16 | End: 2023-05-16

## 2023-05-16 RX ORDER — FAMOTIDINE 10 MG/ML
1 INJECTION INTRAVENOUS
Refills: 0 | DISCHARGE

## 2023-05-16 RX ORDER — HYDROCHLOROTHIAZIDE 25 MG
1 TABLET ORAL
Refills: 0 | DISCHARGE

## 2023-05-16 RX ORDER — ACETAMINOPHEN 500 MG
975 TABLET ORAL EVERY 6 HOURS
Refills: 0 | Status: DISCONTINUED | OUTPATIENT
Start: 2023-05-16 | End: 2023-05-17

## 2023-05-16 RX ORDER — FAMOTIDINE 10 MG/ML
40 INJECTION INTRAVENOUS DAILY
Refills: 0 | Status: DISCONTINUED | OUTPATIENT
Start: 2023-05-16 | End: 2023-05-17

## 2023-05-16 RX ADMIN — OXYCODONE HYDROCHLORIDE 5 MILLIGRAM(S): 5 TABLET ORAL at 22:00

## 2023-05-16 RX ADMIN — SODIUM CHLORIDE 100 MILLILITER(S): 9 INJECTION, SOLUTION INTRAVENOUS at 16:11

## 2023-05-16 RX ADMIN — ESCITALOPRAM OXALATE 10 MILLIGRAM(S): 10 TABLET, FILM COATED ORAL at 23:17

## 2023-05-16 RX ADMIN — SODIUM CHLORIDE 1000 MILLILITER(S): 9 INJECTION, SOLUTION INTRAVENOUS at 17:31

## 2023-05-16 RX ADMIN — Medication 400 MILLIGRAM(S): at 17:21

## 2023-05-16 RX ADMIN — HYDROMORPHONE HYDROCHLORIDE 0.5 MILLIGRAM(S): 2 INJECTION INTRAMUSCULAR; INTRAVENOUS; SUBCUTANEOUS at 16:11

## 2023-05-16 RX ADMIN — ATORVASTATIN CALCIUM 40 MILLIGRAM(S): 80 TABLET, FILM COATED ORAL at 23:17

## 2023-05-16 RX ADMIN — HYDROMORPHONE HYDROCHLORIDE 0.5 MILLIGRAM(S): 2 INJECTION INTRAMUSCULAR; INTRAVENOUS; SUBCUTANEOUS at 16:26

## 2023-05-16 RX ADMIN — Medication 1000 MILLIGRAM(S): at 17:51

## 2023-05-16 RX ADMIN — GABAPENTIN 300 MILLIGRAM(S): 400 CAPSULE ORAL at 17:24

## 2023-05-16 RX ADMIN — Medication 81 MILLIGRAM(S): at 17:24

## 2023-05-16 RX ADMIN — CLOPIDOGREL BISULFATE 75 MILLIGRAM(S): 75 TABLET, FILM COATED ORAL at 17:24

## 2023-05-16 NOTE — CHART NOTE - NSCHARTNOTEFT_GEN_A_CORE
Post Operative Note  Patient: CRUZ MASSEY 77y (1945) Male   MRN: [id number]  Location: [facility]  Visit: 05-16-23 Inpatient  Date: 05-16-23 @ 19:56    Procedure: S/P L CEA     Subjective:   Patient seen and examined at bedside. Patient endorsed ear pain earlier but has now resolved. He denies nausea, vomiting, neck pain, SOB.    Objective:  Vitals: T(F): 97.5 (05-16-23 @ 20:00), Max: 98.2 (05-16-23 @ 09:55)  HR: 64 (05-16-23 @ 19:30)  BP: 108/48 (05-16-23 @ 19:00) (103/54 - 154/76)  RR: 12 (05-16-23 @ 19:30)  SpO2: 100% (05-16-23 @ 19:30)  Vent Settings:     In:   05-16-23 @ 07:01  -  05-16-23 @ 19:56  --------------------------------------------------------  IN: 1020 mL      IV Fluids: lactated ringers. 1000 milliLiter(s) (100 mL/Hr) IV Continuous <Continuous>  lactated ringers. 1000 milliLiter(s) (30 mL/Hr) IV Continuous <Continuous>      Out:   05-16-23 @ 07:01  -  05-16-23 @ 19:56  --------------------------------------------------------  OUT: 285 mL      EBL:     Voided Urine:   05-16-23 @ 07:01  -  05-16-23 @ 19:56  --------------------------------------------------------  OUT: 285 mL      Silvestre Catheter: yes no   Drains:   YADIEL:    ,   Chest Tube:      NG Tube:         Physical Examination:  General: NAD, resting comfortably in bed  HEENT: Normocephalic atraumatic, R CEA scar, L CEA dressing strikethrough, soft, nontender, mild fullness on superior aspect which is known   Respiratory: Nonlabored respirations  Cardio: pulse present   Vascular: extremities are warm and well perfused.   Muscular: LLE mild weakness from previous CVA, otherwise neuromuscular intact     Imaging:  No post-op imaging studies    Assessment:  77yMale patient S/P L CEA     Plan:  - ASA/plavix  - Pain control PRN  - Diet: DASH   - Activity: as tolerated   - shamika arboleda dc'ed at midnight      Date/Time: 05-16-23 @ 19:56    Surgery C Team  [id number]

## 2023-05-16 NOTE — PROVIDER CONTACT NOTE (OTHER) - ASSESSMENT
neuro status intact. vitals stable. surgical incision clean dry and intact. denies any pain to surgical incision. denies HA. denies ringing in ears. c/o external pain 10/10 to b/l ears.

## 2023-05-17 ENCOUNTER — TRANSCRIPTION ENCOUNTER (OUTPATIENT)
Age: 78
End: 2023-05-17

## 2023-05-17 VITALS
RESPIRATION RATE: 16 BRPM | TEMPERATURE: 98 F | DIASTOLIC BLOOD PRESSURE: 56 MMHG | HEART RATE: 66 BPM | OXYGEN SATURATION: 96 % | SYSTOLIC BLOOD PRESSURE: 111 MMHG

## 2023-05-17 LAB
ALBUMIN SERPL ELPH-MCNC: 3.9 G/DL — SIGNIFICANT CHANGE UP (ref 3.3–5)
ALP SERPL-CCNC: 54 U/L — SIGNIFICANT CHANGE UP (ref 40–120)
ALT FLD-CCNC: 10 U/L — SIGNIFICANT CHANGE UP (ref 4–41)
ANION GAP SERPL CALC-SCNC: 11 MMOL/L — SIGNIFICANT CHANGE UP (ref 7–14)
AST SERPL-CCNC: 16 U/L — SIGNIFICANT CHANGE UP (ref 4–40)
BILIRUB SERPL-MCNC: 0.7 MG/DL — SIGNIFICANT CHANGE UP (ref 0.2–1.2)
BUN SERPL-MCNC: 14 MG/DL — SIGNIFICANT CHANGE UP (ref 7–23)
CALCIUM SERPL-MCNC: 9.1 MG/DL — SIGNIFICANT CHANGE UP (ref 8.4–10.5)
CHLORIDE SERPL-SCNC: 104 MMOL/L — SIGNIFICANT CHANGE UP (ref 98–107)
CO2 SERPL-SCNC: 24 MMOL/L — SIGNIFICANT CHANGE UP (ref 22–31)
CREAT SERPL-MCNC: 0.89 MG/DL — SIGNIFICANT CHANGE UP (ref 0.5–1.3)
EGFR: 87 ML/MIN/1.73M2 — SIGNIFICANT CHANGE UP
GLUCOSE SERPL-MCNC: 168 MG/DL — HIGH (ref 70–99)
HCT VFR BLD CALC: 34 % — LOW (ref 39–50)
HGB BLD-MCNC: 10.8 G/DL — LOW (ref 13–17)
MCHC RBC-ENTMCNC: 31.1 PG — SIGNIFICANT CHANGE UP (ref 27–34)
MCHC RBC-ENTMCNC: 31.8 GM/DL — LOW (ref 32–36)
MCV RBC AUTO: 98 FL — SIGNIFICANT CHANGE UP (ref 80–100)
NRBC # BLD: 0 /100 WBCS — SIGNIFICANT CHANGE UP (ref 0–0)
NRBC # FLD: 0 K/UL — SIGNIFICANT CHANGE UP (ref 0–0)
PLATELET # BLD AUTO: 125 K/UL — LOW (ref 150–400)
POTASSIUM SERPL-MCNC: 5 MMOL/L — SIGNIFICANT CHANGE UP (ref 3.5–5.3)
POTASSIUM SERPL-SCNC: 5 MMOL/L — SIGNIFICANT CHANGE UP (ref 3.5–5.3)
PROT SERPL-MCNC: 5.5 G/DL — LOW (ref 6–8.3)
RBC # BLD: 3.47 M/UL — LOW (ref 4.2–5.8)
RBC # FLD: 12.4 % — SIGNIFICANT CHANGE UP (ref 10.3–14.5)
SODIUM SERPL-SCNC: 139 MMOL/L — SIGNIFICANT CHANGE UP (ref 135–145)
WBC # BLD: 7.14 K/UL — SIGNIFICANT CHANGE UP (ref 3.8–10.5)
WBC # FLD AUTO: 7.14 K/UL — SIGNIFICANT CHANGE UP (ref 3.8–10.5)

## 2023-05-17 RX ORDER — ACETAMINOPHEN 500 MG
3 TABLET ORAL
Qty: 0 | Refills: 0 | DISCHARGE
Start: 2023-05-17

## 2023-05-17 RX ADMIN — Medication 975 MILLIGRAM(S): at 01:32

## 2023-05-17 RX ADMIN — GABAPENTIN 300 MILLIGRAM(S): 400 CAPSULE ORAL at 05:48

## 2023-05-17 RX ADMIN — Medication 975 MILLIGRAM(S): at 08:00

## 2023-05-17 RX ADMIN — Medication 975 MILLIGRAM(S): at 07:03

## 2023-05-17 RX ADMIN — FAMOTIDINE 40 MILLIGRAM(S): 10 INJECTION INTRAVENOUS at 13:06

## 2023-05-17 RX ADMIN — Medication 81 MILLIGRAM(S): at 13:07

## 2023-05-17 RX ADMIN — CLOPIDOGREL BISULFATE 75 MILLIGRAM(S): 75 TABLET, FILM COATED ORAL at 13:06

## 2023-05-17 RX ADMIN — LISINOPRIL 40 MILLIGRAM(S): 2.5 TABLET ORAL at 05:48

## 2023-05-17 RX ADMIN — Medication 975 MILLIGRAM(S): at 13:06

## 2023-05-17 RX ADMIN — Medication 975 MILLIGRAM(S): at 13:57

## 2023-05-17 RX ADMIN — Medication 975 MILLIGRAM(S): at 02:06

## 2023-05-17 RX ADMIN — Medication 50 MILLIGRAM(S): at 05:48

## 2023-05-17 NOTE — DISCHARGE NOTE PROVIDER - HOSPITAL COURSE
Patient was admitted to undergo Left carotid endarterectomy. They tolerated the procedure well and were transferred to the PACU in stable condition. After a monitoring period, he was then transferred to the surgical floor. The patient remained hemodynamically stable throughout their post-op course. On POD #1, patient was tolerating diet, voiding without difficulty, ambulating without assistance, and pain was well-controlled. They were then found to be stable for discharge to home and will follow-up as outpatient for  post-op visit.

## 2023-05-17 NOTE — DISCHARGE NOTE PROVIDER - NSDCFUADDINST_GEN_ALL_CORE_FT
WOUND CARE:  Please keep incisions clean and dry. Please do not Scrub or rub incisions. Do not use lotion or powder on incisions.   BATHING: You may shower and/or sponge bathe. You may use warm soapy water in the shower and rinse, pat dry.  ACTIVITY: No heavy lifting or straining. Otherwise, you may return to your usual level of physical activity. If you are taking narcotic pain medication DO NOT drive a car, operate machinery or make important decisions.  DIET: Return to your usual diet.  NOTIFY YOUR SURGEON IF YOU HAVE: any bleeding that does not stop, any pus draining from your wound(s), any fever (over 100.4 F) persistent nausea/vomiting, or if your pain is not controlled on your discharge pain medications, unable to urinate.  Please follow up with your primary care physician in one week regarding your hospitalization, bring copies of your discharge paperwork.  Please follow up with your surgeon, Dr. Swain as an outpatient, please call to schedule appointment

## 2023-05-17 NOTE — PROGRESS NOTE ADULT - ASSESSMENT
77M hx 90% RCA occlusion and >50% LCA occlusion, past TIA 2018 with no residual deficits, CAD, GERD, HLD, HTN, left leg sciatica, s/p R CEA on 3/21 now s/p L CEA on 5/16.    Plan:  - TOV by 9am  - discharge today    Vascular Surgery  [id number]

## 2023-05-17 NOTE — PATIENT PROFILE ADULT - FALL HARM RISK - HARM RISK INTERVENTIONS

## 2023-05-17 NOTE — DISCHARGE NOTE NURSING/CASE MANAGEMENT/SOCIAL WORK - NSPROMEDSBROUGHTTOHOSP_GEN_A_NUR
How Severe Are Your Warts?: moderate Is This A New Presentation, Or A Follow-Up?: Follow Up Carmen yes

## 2023-05-17 NOTE — DISCHARGE NOTE NURSING/CASE MANAGEMENT/SOCIAL WORK - NSDPDISTO_GEN_ALL_CORE
Pt. is afebrile and offers no complaints. In no acute distress. Left neck dressing: clean, dry and intact. Pt is ambulating, tolerating diet well, and voiding in adequate amounts./Home

## 2023-05-17 NOTE — DISCHARGE NOTE NURSING/CASE MANAGEMENT/SOCIAL WORK - NSDCPEFALRISK_GEN_ALL_CORE
For information on Fall & Injury Prevention, visit: [email or web address] OR  [email or web address] OR  https://www.cdc.gov/steadi/patient.html

## 2023-05-17 NOTE — DISCHARGE NOTE PROVIDER - NSDCMRMEDTOKEN_GEN_ALL_CORE_FT
aspirin 81 mg oral tablet: 1 tab(s) orally once a day  atorvastatin 40 mg oral tablet: 1 tab(s) orally once a day (at bedtime)  clopidogrel 75 mg oral tablet: 1 tab(s) orally once a day  escitalopram 10 mg oral tablet: 1 tab(s) orally once a day (at bedtime)  famotidine 40 mg oral tablet: 1 tab(s) orally once a day  gabapentin 300 mg oral capsule: 1 cap(s) orally 2 times a day  hydroCHLOROthiazide 12.5 mg oral capsule: 1 cap(s) orally once a day  metoprolol succinate 50 mg oral tablet, extended release: 1 tab(s) orally once a day  omeprazole 20 mg oral delayed release capsule: 1 cap(s) orally once a day  ramipril 10 mg oral tablet: 1 tab(s) orally once a day   acetaminophen 325 mg oral tablet: 3 tab(s) orally every 6 hours as needed for  moderate pain  aspirin 81 mg oral tablet: 1 tab(s) orally once a day  atorvastatin 40 mg oral tablet: 1 tab(s) orally once a day (at bedtime)  clopidogrel 75 mg oral tablet: 1 tab(s) orally once a day  escitalopram 10 mg oral tablet: 1 tab(s) orally once a day (at bedtime)  famotidine 40 mg oral tablet: 1 tab(s) orally once a day  gabapentin 300 mg oral capsule: 1 cap(s) orally 2 times a day  hydroCHLOROthiazide 12.5 mg oral capsule: 1 cap(s) orally once a day  metoprolol succinate 50 mg oral tablet, extended release: 1 tab(s) orally once a day  omeprazole 20 mg oral delayed release capsule: 1 cap(s) orally once a day  ramipril 10 mg oral tablet: 1 tab(s) orally once a day

## 2023-05-17 NOTE — PROGRESS NOTE ADULT - SUBJECTIVE AND OBJECTIVE BOX
C Team (VascularSurgery) Daily Progress Note    SUBJECTIVE:  Pt seen and examined, and is resting comfortably in bed. No acute events overnight. Pain is adequately controlled on current regimen. Pt has no complaints at this time. Patient is eating without difficulty and hasn't had any signs or symptoms of neurovascular compromise. Overnight had a bit of ear pain which has since improved.    OBJECTIVE:  Vital Signs Last 24 Hrs  T(C): 36.6 (17 May 2023 05:54), Max: 36.8 (16 May 2023 09:55)  T(F): 97.8 (17 May 2023 05:54), Max: 98.2 (16 May 2023 09:55)  HR: 71 (17 May 2023 05:54) (55 - 73)  BP: 115/57 (17 May 2023 05:54) (103/54 - 154/76)  BP(mean): 79 (16 May 2023 21:00) (62 - 95)  RR: 16 (17 May 2023 05:54) (12 - 20)  SpO2: 97% (17 May 2023 05:54) (93% - 100%)    Parameters below as of 17 May 2023 05:54  Patient On (Oxygen Delivery Method): room air        I&O's Detail    16 May 2023 07:01  -  17 May 2023 07:00  --------------------------------------------------------  IN:    IV PiggyBack: 100 mL    Lactated Ringers: 500 mL    Lactated Ringers Bolus: 500 mL    Oral Fluid: 370 mL  Total IN: 1470 mL    OUT:    Indwelling Catheter - Urethral (mL): 1285 mL  Total OUT: 1285 mL    Total NET: 185 mL        Exam:  General: NAD, resting comfortably in bed  HEENT: Normocephalic atraumatic, R CEA scar, L CEA dressing w/ minimal strikethrough unchanged from post-op, soft, nontender, mild fullness on superior aspect which is known   Respiratory: Nonlabored respirations  Cardio: pulse present   Vascular: extremities are warm and well perfused.   Muscular: LLE mild weakness from previous CVA, otherwise neuromuscular intact

## 2023-05-17 NOTE — DISCHARGE NOTE PROVIDER - NSDCCPCAREPLAN_GEN_ALL_CORE_FT
PRINCIPAL DISCHARGE DIAGNOSIS  Diagnosis: Carotid stenosis  Assessment and Plan of Treatment: s/p L CEA      SECONDARY DISCHARGE DIAGNOSES  Diagnosis: History of TIAs  Assessment and Plan of Treatment:     Diagnosis: Hypertension  Assessment and Plan of Treatment:     Diagnosis: Hyperlipidemia  Assessment and Plan of Treatment:

## 2023-05-17 NOTE — PATIENT PROFILE ADULT - FUNCTIONAL ASSESSMENT - BASIC MOBILITY 4.
Provider, MD   ciprofloxacin (CIPRO) 500 MG tablet Take 500 mg by mouth 2 times daily 6/12/20 6/22/20  Historical Provider, MD     Allergies  has No Known Allergies. Family History  family history is not on file. Social History   reports that she has never smoked. She has never used smokeless tobacco.    Review of Systems:  General Denies any fever or chills  HEENT Denies any diplopia, tinnitus or vertigo  Resp Denies any shortness of breath, cough or wheezing  Cardiac Denies any chest pain, palpitations, claudication or edema  GI Denies any melena, hematochezia, hematemesis or pyrosis   Denies any frequency, urgency, hesitancy or incontinence  Heme Denies bruising or bleeding easily  Endocrine Denies any history of diabetes or thyroid disease  Neuro Denies any focal motor or sensory deficits    OBJECTIVE:   VITALS:  height is 5' 4\" (1.626 m) and weight is 96 lb 12.5 oz (43.9 kg). Her axillary temperature is 97.7 °F (36.5 °C). Her blood pressure is 133/82 and her pulse is 94. Her respiration is 20 and oxygen saturation is 97%. CONSTITUTIONAL: Alert and oriented to self, not oriented to place time or situation. No acute distress and cooperative to examination with proper mood and affect. SKIN: Skin color, texture, turgor normal. No rashes or lesions. LYMPH: no cervical nodes, no inguinal nodes  HEENT: Head is normocephalic, atraumatic. EOMI, PERRLA  NECK: Supple, symmetrical, trachea midline, no adenopathy, thyroid symmetric, not enlarged and no tenderness, skin normal  CHEST/LUNGS: chest symmetric with normal A/P diameter, normal respiratory rate and rhythm, lungs clear to auscultation without wheezes, rales or rhonchi. No accessory muscle use. CARDIOVASCULAR: Heart regular rate and rhythm Normal S1 and S2. . Carotid and femoral pulses 2+/4 and equal bilaterally  ABDOMEN: Normal shape. Midline abdominal and transverse lower abdominal scar(s) present. Normal bowel sounds. No bruits.   Soft, personally reviewed the following films:  Ct Abdomen Pelvis W Iv Contrast Additional Contrast? None    Result Date: 6/17/2020  EXAMINATION: CT OF THE ABDOMEN AND PELVIS WITH CONTRAST 6/17/2020 8:30 am TECHNIQUE: CT of the abdomen and pelvis was performed with the administration of intravenous contrast. Multiplanar reformatted images are provided for review. Dose modulation, iterative reconstruction, and/or weight based adjustment of the mA/kV was utilized to reduce the radiation dose to as low as reasonably achievable. COMPARISON: None. HISTORY: ORDERING SYSTEM PROVIDED HISTORY: Pelvic abcess / Hydronephrosis TECHNOLOGIST PROVIDED HISTORY: Pelvic abcess / Hydronephrosis Reason for Exam: Pelvic abcess / Hydronephrosis, has stent in place Acuity: Acute Type of Exam: Initial FINDINGS: Lower Chest: No acute findings. Organs: Cholelithiasis in a contracted gallbladder. No biliary dilatation. The liver, pancreas, spleen and adrenals appear unremarkable. Right double-J ureteral stent in place. Mild residual hydronephrosis and right renal edema. Mild urothelial enhancement on the right side. No renal calculi. The left kidney is remarkable for a subcentimeter intraparenchymal cyst. GI/Bowel: Status post distal colonic resection. No evidence for bowel obstruction. Moderate stool burden. Pelvis: Right pelvic abscess in the presacral space measures up to 5.7 cm in greatest transverse dimension, approximately 7 cm in craniocaudal dimension and 2.8 cm in AP dimension. The bladder is decompressed. Peritoneum/Retroperitoneum: No free air. No ascites. No lymphadenopathy. The aorta is normal in caliber. Bones/Soft Tissues: No acute osseous abnormality identified. 1.  Pelvic abscess predominates in the presacral space extending to the right side. 2.  Right double-J ureteral stent in place. Mild residual hydronephrosis, urothelial enhancement and right renal edema. 3.  Cholelithiasis in a contracted gallbladder. 3 = A little assistance

## 2023-05-17 NOTE — DISCHARGE NOTE NURSING/CASE MANAGEMENT/SOCIAL WORK - PATIENT PORTAL LINK FT
You can access the FollowMyHealth Patient Portal offered by [facility] by registering at the following website: [email or web address]. By joining [facility]’s FollowMyHealth portal, you will also be able to view your health information using other applications (apps) compatible with our system.

## 2023-05-17 NOTE — DISCHARGE NOTE PROVIDER - CARE PROVIDER_API CALL
Blaine Swain)  Surgery; Vascular Surgery  [phone] [place] [address]  Phone: [phone]  Fax: [phone]  Follow Up Time: 1 week

## 2023-05-17 NOTE — DISCHARGE NOTE NURSING/CASE MANAGEMENT/SOCIAL WORK - NSDCPNINST_GEN_ALL_CORE
Make a follow up appointment with Dr. Swain. Call MD if you develop a fever, or if there is redness, swelling, drainage or pain not relieved by pain medication. No heavy lifting, bending, or straining to move your bowels. Take over the counter stool softeners as needed to prevent constipation which may be caused by pain medication.

## 2023-05-21 NOTE — HISTORY OF PRESENT ILLNESS
[de-identified] : Mr. CRUZ MASSEY is a 77 year old male, accompanied by his wife, with hx of HTN, anxiety, low back pain / lumbar stenosis, presents for follow-up visit\par He is s/p Rt CEA ( 3/21/2023 )  Reports feeling well\par Denies CP,  SOB, lightheadedness, and dizziness, N/V/D, or abdominal pain.

## 2023-05-21 NOTE — ASSESSMENT
[FreeTextEntry1] : \par He is s/p Rt CEA ( 3/21/2023 ) \par following with vascular\par \par Hx of low back pain, hx od lumbar stenosis, hx of epidural injection\par was referred for PT on his last visit but pt did not go. Says he wants to go now- new referral given\par \par Hx of HTN\par cont current meds\par follows with cardiology\par \par Anxiety\par cont Lexapro 10mg daily\par \par \par

## 2023-05-21 NOTE — PHYSICAL EXAM
[No Acute Distress] : no acute distress [Well Nourished] : well nourished [Well Developed] : well developed [Well-Appearing] : well-appearing [Normal Sclera/Conjunctiva] : normal sclera/conjunctiva [PERRL] : pupils equal round and reactive to light [EOMI] : extraocular movements intact [Normal Outer Ear/Nose] : the outer ears and nose were normal in appearance [Normal Oropharynx] : the oropharynx was normal [No JVD] : no jugular venous distention [No Lymphadenopathy] : no lymphadenopathy [Supple] : supple [No Respiratory Distress] : no respiratory distress  [No Accessory Muscle Use] : no accessory muscle use [Clear to Auscultation] : lungs were clear to auscultation bilaterally [Normal Rate] : normal rate  [Regular Rhythm] : with a regular rhythm [Normal S1, S2] : normal S1 and S2 [No Murmur] : no murmur heard [No Edema] : there was no peripheral edema [No Extremity Clubbing/Cyanosis] : no extremity clubbing/cyanosis [Soft] : abdomen soft [Non Tender] : non-tender [Non-distended] : non-distended [Normal Bowel Sounds] : normal bowel sounds [Normal Anterior Cervical Nodes] : no anterior cervical lymphadenopathy [No CVA Tenderness] : no CVA  tenderness [No Joint Swelling] : no joint swelling [No Rash] : no rash [Coordination Grossly Intact] : coordination grossly intact [No Focal Deficits] : no focal deficits [Normal Gait] : normal gait [Normal Affect] : the affect was normal [Alert and Oriented x3] : oriented to person, place, and time [Normal Insight/Judgement] : insight and judgment were intact

## 2023-06-01 ENCOUNTER — APPOINTMENT (OUTPATIENT)
Dept: VASCULAR SURGERY | Facility: CLINIC | Age: 78
End: 2023-06-01
Payer: MEDICARE

## 2023-06-01 VITALS
HEART RATE: 62 BPM | DIASTOLIC BLOOD PRESSURE: 78 MMHG | WEIGHT: 214 LBS | SYSTOLIC BLOOD PRESSURE: 132 MMHG | HEIGHT: 67 IN | BODY MASS INDEX: 33.59 KG/M2

## 2023-06-01 VITALS — TEMPERATURE: 98.1 F | HEART RATE: 62 BPM | SYSTOLIC BLOOD PRESSURE: 132 MMHG | DIASTOLIC BLOOD PRESSURE: 80 MMHG

## 2023-06-01 PROCEDURE — 99024 POSTOP FOLLOW-UP VISIT: CPT

## 2023-06-05 LAB — SURGICAL PATHOLOGY STUDY: SIGNIFICANT CHANGE UP

## 2023-06-09 ENCOUNTER — OUTPATIENT (OUTPATIENT)
Dept: OUTPATIENT SERVICES | Facility: HOSPITAL | Age: 78
LOS: 1 days | End: 2023-06-09
Payer: MEDICARE

## 2023-06-09 ENCOUNTER — APPOINTMENT (OUTPATIENT)
Dept: CT IMAGING | Facility: IMAGING CENTER | Age: 78
End: 2023-06-09
Payer: MEDICARE

## 2023-06-09 DIAGNOSIS — Z98.890 OTHER SPECIFIED POSTPROCEDURAL STATES: Chronic | ICD-10-CM

## 2023-06-09 DIAGNOSIS — I77.9 DISORDER OF ARTERIES AND ARTERIOLES, UNSPECIFIED: ICD-10-CM

## 2023-06-09 PROCEDURE — 70450 CT HEAD/BRAIN W/O DYE: CPT

## 2023-06-09 PROCEDURE — 70450 CT HEAD/BRAIN W/O DYE: CPT | Mod: 26

## 2023-06-12 ENCOUNTER — NON-APPOINTMENT (OUTPATIENT)
Age: 78
End: 2023-06-12

## 2023-06-23 ENCOUNTER — APPOINTMENT (OUTPATIENT)
Dept: VASCULAR SURGERY | Facility: CLINIC | Age: 78
End: 2023-06-23
Payer: MEDICARE

## 2023-06-23 VITALS
TEMPERATURE: 98.4 F | WEIGHT: 214 LBS | DIASTOLIC BLOOD PRESSURE: 82 MMHG | BODY MASS INDEX: 33.59 KG/M2 | HEIGHT: 67 IN | HEART RATE: 56 BPM | SYSTOLIC BLOOD PRESSURE: 137 MMHG

## 2023-06-23 VITALS — SYSTOLIC BLOOD PRESSURE: 137 MMHG | HEART RATE: 57 BPM | DIASTOLIC BLOOD PRESSURE: 76 MMHG

## 2023-06-23 PROCEDURE — 99024 POSTOP FOLLOW-UP VISIT: CPT

## 2023-06-23 PROCEDURE — 93880 EXTRACRANIAL BILAT STUDY: CPT

## 2023-06-23 NOTE — HISTORY OF PRESENT ILLNESS
[FreeTextEntry1] : \par CRUZ MASSEY (: Jul  3 1945) is a 77 year old male who presents today for post-op ultrasound. \par \par He is s/p bilateral CEA (L 2023, R 3/21/2023).\par He has a history of stroke which occurred about 3 years ago. No residual deficits. \par Denies focal neurologic deficits including amaurosis fugax, slurred speech, and weakness in the arms/legs. \par Complaint with aspirin, Plavix, and atorvastatin. \par \par Today patient reports feeling well overall. Shaved his face and neck and complains of some dried blood on his L neck incision. He is looking forward to his trip to [facility] next month.  \par \par PMH: HTN, HLD, stroke\par \par 2023\par Carotid duplex demonstrates bilaterally patent ICAs s/p CEA without stenosis. Bilaterally antegrade vertebral arteries. \par \par

## 2023-06-23 NOTE — ASSESSMENT
[FreeTextEntry1] : A/P\par \par Patient with carotid artery disease now s/p bilateral CEA procedures. Post-op duplex demonstrates patent ICAs bilaterally without evidence of stenosis. \par \par Advise continuing with antiplatelet and statin therapy. Advise routine surveillance ultrasounds. Patient states that he is going to [facility] for an extended period of time. He think he will return in one year. Have advised him to call our office when he returns to schedule a follow up appointment with carotid duplex.

## 2023-06-23 NOTE — PHYSICAL EXAM
[2+] : right 2+ [Calm] : calm [de-identified] : NAD [de-identified] : R CEA well healed; L CEA incision healed nicely with dried flaky blood overlying skin, no active bleed, no swelling/erythema/tenderness [de-identified] : unlabored [de-identified] : grossly normal

## 2023-06-25 NOTE — PHYSICAL EXAM
[2+] : left 2+ [Calm] : calm [de-identified] : NAD [de-identified] : R CEA well healed; L CEA c/d/i without any signs of infection [de-identified] : unlabored [de-identified] : grossly normal

## 2023-06-25 NOTE — ASSESSMENT
[FreeTextEntry1] : \par A/P\par \par Patient is doing well s/p bilateral CEA. Headaches s/p recent L CEA getting better. Per discussion with Dr. Swain, will obtain CTH to r/o bleed.

## 2023-06-25 NOTE — HISTORY OF PRESENT ILLNESS
[FreeTextEntry1] : \par CRUZ MASSEY (: Jul  3 1945) is a 77 year old male who presents today for post-op visit. \par \par He is s/p L CEA (2023) for asymptomatic high grade carotid artery stenosis.\par He has a history of stroke which occurred about 3 years ago. No residual deficits. \par Denies focal neurologic deficits including amaurosis fugax, slurred speech, and weakness in the arms/legs. \par Denies fever, chills, pain, swelling, dysphagia, dysphonia. \par Complaint with aspirin, Plavix, and atorvastatin. \par \par He also had a recent R CEA (3/21/2023).\par \par Today he complains of nocturnal headaches, improved with Tylenol. Reports symptoms started after R CEA surgery. States that the headaches are getting better. \par \par PMH: HTN, HLD, stroke\par

## 2024-01-10 NOTE — ED PROVIDER NOTE - CONSTITUTIONAL, MLM
normal... Well appearing, well nourished, awake, alert, oriented to person, place, time/situation and in no apparent distress. FAMILY HISTORY:  No pertinent family history in first degree relatives

## 2024-02-29 NOTE — CONSULT NOTE ADULT - CONSULT REASON
Stable and controlled  Continue medication as documented in your medication list Nurtec 75 1 tablet every other day   Carotid stenosis

## 2024-05-29 ENCOUNTER — APPOINTMENT (OUTPATIENT)
Dept: INTERNAL MEDICINE | Facility: CLINIC | Age: 79
End: 2024-05-29
Payer: MEDICARE

## 2024-05-29 VITALS
WEIGHT: 194 LBS | TEMPERATURE: 97.6 F | HEIGHT: 67 IN | BODY MASS INDEX: 30.45 KG/M2 | OXYGEN SATURATION: 96 % | DIASTOLIC BLOOD PRESSURE: 74 MMHG | RESPIRATION RATE: 14 BRPM | HEART RATE: 51 BPM | SYSTOLIC BLOOD PRESSURE: 126 MMHG

## 2024-05-29 DIAGNOSIS — Z00.00 ENCOUNTER FOR GENERAL ADULT MEDICAL EXAMINATION W/OUT ABNORMAL FINDINGS: ICD-10-CM

## 2024-05-29 PROCEDURE — 99397 PER PM REEVAL EST PAT 65+ YR: CPT

## 2024-05-29 PROCEDURE — G0444 DEPRESSION SCREEN ANNUAL: CPT | Mod: 59

## 2024-05-29 RX ORDER — ESCITALOPRAM OXALATE 10 MG/1
10 TABLET ORAL
Qty: 90 | Refills: 1 | Status: ACTIVE | COMMUNITY
Start: 2023-01-18 | End: 1900-01-01

## 2024-05-29 RX ORDER — CLOPIDOGREL BISULFATE 75 MG/1
75 TABLET, FILM COATED ORAL DAILY
Qty: 1 | Refills: 1 | Status: ACTIVE | COMMUNITY
Start: 2022-12-15 | End: 1900-01-01

## 2024-05-29 RX ORDER — EZETIMIBE 10 MG/1
10 TABLET ORAL
Qty: 90 | Refills: 1 | Status: ACTIVE | COMMUNITY
Start: 2023-01-18 | End: 1900-01-01

## 2024-05-29 RX ORDER — ATORVASTATIN CALCIUM 40 MG/1
40 TABLET, FILM COATED ORAL
Qty: 90 | Refills: 1 | Status: ACTIVE | COMMUNITY
Start: 2022-12-15 | End: 1900-01-01

## 2024-05-29 RX ORDER — FAMOTIDINE 40 MG/1
40 TABLET, FILM COATED ORAL
Qty: 90 | Refills: 1 | Status: ACTIVE | COMMUNITY
Start: 2023-07-10 | End: 1900-01-01

## 2024-05-29 RX ORDER — METOPROLOL SUCCINATE 50 MG/1
50 TABLET, EXTENDED RELEASE ORAL
Qty: 90 | Refills: 1 | Status: ACTIVE | COMMUNITY
Start: 2023-01-18 | End: 1900-01-01

## 2024-05-29 RX ORDER — GABAPENTIN 300 MG/1
300 CAPSULE ORAL
Qty: 180 | Refills: 1 | Status: ACTIVE | COMMUNITY
Start: 2023-01-18 | End: 1900-01-01

## 2024-05-30 ENCOUNTER — APPOINTMENT (OUTPATIENT)
Dept: VASCULAR SURGERY | Facility: CLINIC | Age: 79
End: 2024-05-30
Payer: MEDICARE

## 2024-05-30 VITALS
TEMPERATURE: 98 F | HEART RATE: 52 BPM | WEIGHT: 205 LBS | DIASTOLIC BLOOD PRESSURE: 78 MMHG | BODY MASS INDEX: 32.18 KG/M2 | HEIGHT: 67 IN | SYSTOLIC BLOOD PRESSURE: 136 MMHG

## 2024-05-30 VITALS — SYSTOLIC BLOOD PRESSURE: 123 MMHG | HEART RATE: 48 BPM | DIASTOLIC BLOOD PRESSURE: 70 MMHG

## 2024-05-30 DIAGNOSIS — I77.9 DISORDER OF ARTERIES AND ARTERIOLES, UNSPECIFIED: ICD-10-CM

## 2024-05-30 PROCEDURE — 93880 EXTRACRANIAL BILAT STUDY: CPT

## 2024-05-30 PROCEDURE — 99212 OFFICE O/P EST SF 10 MIN: CPT

## 2024-06-01 NOTE — ASSESSMENT
[FreeTextEntry1] : Physical  UTD with colonoscopy  HTN, Hx of CAD cont Quinapril 40mg daily  cont Metoprolol 50mg daily cont Ezetimibe 10mg daily cont Clopidogrel 75mg  cont Atorvastatin 40mg daily Reinforced the importance of following a low sodium diet. follows with cardiology  s/p bilateral CEA (L 5/16/2023, R 3/21/2023). cont Clopidogrel 75mg  Follows with vascular -has an luis enrique't with vascular tomorrow  Hx of heartburn cont Famotidine 40mg daily  Hx of anxiety cont Lexapro 10mg daily  All meds renewed.  Blood work ordered. Follow up in one week for lab results

## 2024-06-01 NOTE — HEALTH RISK ASSESSMENT
[Yes] : Yes [No falls in past year] : Patient reported no falls in the past year [Little interest or pleasure doing things] : 1) Little interest or pleasure doing things [Feeling down, depressed, or hopeless] : 2) Feeling down, depressed, or hopeless [0] : 2) Feeling down, depressed, or hopeless: Not at all (0) [PHQ-2 Negative - No further assessment needed] : PHQ-2 Negative - No further assessment needed [Never] : Never [Patient reported colonoscopy was normal] : Patient reported colonoscopy was normal [With Family] : lives with family [Retired] : retired [] :  [# Of Children ___] : has [unfilled] children [Feels Safe at Home] : Feels safe at home [Fully functional (bathing, dressing, toileting, transferring, walking, feeding)] : Fully functional (bathing, dressing, toileting, transferring, walking, feeding) [Fully functional (using the telephone, shopping, preparing meals, housekeeping, doing laundry, using] : Fully functional and needs no help or supervision to perform IADLs (using the telephone, shopping, preparing meals, housekeeping, doing laundry, using transportation, managing medications and managing finances) [Reports normal functional visual acuity (ie: able to read med bottle)] : Reports normal functional visual acuity [Smoke Detector] : smoke detector [Carbon Monoxide Detector] : carbon monoxide detector [Seat Belt] :  uses seat belt [Sunscreen] : uses sunscreen [Monthly or less (1 pt)] : Monthly or less (1 point) [1 or 2 (0 pts)] : 1 or 2 (0 points) [Never (0 pts)] : Never (0 points) [None] : None [de-identified] : Occasionally. [de-identified] : Admits he is not acitve.  [de-identified] : Admits his diet could be better.  [[id number]] : 0 [Reports changes in hearing] : Reports no changes in hearing [Reports changes in vision] : Reports no changes in vision [Reports changes in dental health] : Reports no changes in dental health [Travel to Developing Areas] : does not  travel to developing areas [TB Exposure] : is not being exposed to tuberculosis [Caregiver Concerns] : does not have caregiver concerns [ColonoscopyDate] : 2019 [de-identified] : Follows with ophthalmologist. Wears progressive lenses.

## 2024-06-01 NOTE — ADDENDUM
[FreeTextEntry1] : Documented by Yissel Sutton acting as a scribe for Dr. Eva Bolaños. 05/29/2024   All medical record entries made by the scribe were at my, Dr. Eva Bolaños, direction and personally dictated by me on 05/29/2024. I have reviewed the chart and agree that the record accurately reflects my personal performance of the history, physical exam, assessment and plan. I have also personally directed, reviewed, and agreed with the chart.

## 2024-06-01 NOTE — HISTORY OF PRESENT ILLNESS
[de-identified] : Mr. CRUZ MASSEY is an 78-year-old male accompanied by his wife, with hx. of HTN, anxiety, low back pain, lumbar stenosis, s/p bilateral CEA (L 5/16/2023, R 3/21/2023), CAD, who presents for an annual physical exam, Rx refills.  Pt states he is feeling well. Denies any SOB, CP, abdominal pain, N/V/D, headache, dizziness, or leg swelling. Reports compliance with taking his meds daily. Offers no complaints.

## 2024-06-03 PROBLEM — I77.9 CAROTID ARTERY DISEASE: Status: ACTIVE | Noted: 2022-12-20

## 2024-06-03 NOTE — PHYSICAL EXAM
[2+] : left 2+ [Alert] : alert [Oriented to Person] : oriented to person [Oriented to Place] : oriented to place [Oriented to Time] : oriented to time [Calm] : calm [de-identified] : NAD [de-identified] : bilateral CEA incisions well healed [de-identified] : unlabored [de-identified] : grossly normal

## 2024-06-03 NOTE — ASSESSMENT
[FreeTextEntry1] :  A/P  Bilateral CEA, doing well without concerning symptoms.  Duplex shows patent repair without stenosis.  Continue with current medication management.  Follow up in 1 year for carotid duplex.

## 2024-06-03 NOTE — HISTORY OF PRESENT ILLNESS
[FreeTextEntry1] :  CRUZ MASSEY (: Jul  3 1945) is a 78 year old male who presents today for follow up evaluation of carotid artery disease.  He is accompanied by his wife.   He is s/p bilateral CEA (L - 2023, R - 3/21/2023) for asymptomatic high grade carotid artery stenosis. Previous history of stroke which was more than 3 years ago. No residual deficits.   Today he reports feeling well. Recently returned from [facility].  Denies focal neurologic deficits including amaurosis fugax, slurred speech, and weakness in the arms/legs.  Complaint with aspirin, Plavix, and atorvastatin.   PMH: HTN, HLD, stroke  2024 Carotid duplex demonstrates: CJ -- patent CEA, no stenosis  LICA -- patent CEA, no stenosis  Bilateral vertebral arteries with antegrade flow

## 2024-06-29 LAB
25(OH)D3 SERPL-MCNC: 34.8 NG/ML
ALBUMIN SERPL ELPH-MCNC: 4.5 G/DL
ALP BLD-CCNC: 89 U/L
ALT SERPL-CCNC: 12 U/L
ANION GAP SERPL CALC-SCNC: 14 MMOL/L
APPEARANCE: CLEAR
AST SERPL-CCNC: 20 U/L
BASOPHILS # BLD AUTO: 0.04 K/UL
BASOPHILS NFR BLD AUTO: 0.5 %
BILIRUB SERPL-MCNC: 0.5 MG/DL
BILIRUBIN URINE: NEGATIVE
BLOOD URINE: NEGATIVE
BUN SERPL-MCNC: 13 MG/DL
CALCIUM SERPL-MCNC: 10.1 MG/DL
CHLORIDE SERPL-SCNC: 104 MMOL/L
CHOLEST SERPL-MCNC: 153 MG/DL
CO2 SERPL-SCNC: 26 MMOL/L
COLOR: YELLOW
CREAT SERPL-MCNC: 1.15 MG/DL
EGFR: 64 ML/MIN/1.73M2
EOSINOPHIL # BLD AUTO: 0.05 K/UL
EOSINOPHIL NFR BLD AUTO: 0.6 %
ESTIMATED AVERAGE GLUCOSE: 114 MG/DL
GLUCOSE QUALITATIVE U: NEGATIVE MG/DL
GLUCOSE SERPL-MCNC: 64 MG/DL
HBA1C MFR BLD HPLC: 5.6 %
HCT VFR BLD CALC: 47 %
HDLC SERPL-MCNC: 44 MG/DL
HGB BLD-MCNC: 14.6 G/DL
IMM GRANULOCYTES NFR BLD AUTO: 0.1 %
KETONES URINE: NEGATIVE MG/DL
LDLC SERPL CALC-MCNC: 87 MG/DL
LEUKOCYTE ESTERASE URINE: NEGATIVE
LYMPHOCYTES # BLD AUTO: 1.73 K/UL
LYMPHOCYTES NFR BLD AUTO: 21.8 %
MAN DIFF?: NORMAL
MCHC RBC-ENTMCNC: 30.3 PG
MCHC RBC-ENTMCNC: 31.1 GM/DL
MCV RBC AUTO: 97.5 FL
MONOCYTES # BLD AUTO: 0.77 K/UL
MONOCYTES NFR BLD AUTO: 9.7 %
NEUTROPHILS # BLD AUTO: 5.35 K/UL
NEUTROPHILS NFR BLD AUTO: 67.3 %
NITRITE URINE: NEGATIVE
NONHDLC SERPL-MCNC: 110 MG/DL
PH URINE: 5.5
PLATELET # BLD AUTO: 188 K/UL
POTASSIUM SERPL-SCNC: 5.8 MMOL/L
PROT SERPL-MCNC: 6.8 G/DL
PROTEIN URINE: NEGATIVE MG/DL
PSA SERPL-MCNC: 1.85 NG/ML
RBC # BLD: 4.82 M/UL
RBC # FLD: 13.9 %
SODIUM SERPL-SCNC: 144 MMOL/L
SPECIFIC GRAVITY URINE: 1.01
TRIGL SERPL-MCNC: 123 MG/DL
TSH SERPL-ACNC: 1.97 UIU/ML
UROBILINOGEN URINE: 0.2 MG/DL
WBC # FLD AUTO: 7.95 K/UL

## 2024-07-02 DIAGNOSIS — R79.89 OTHER SPECIFIED ABNORMAL FINDINGS OF BLOOD CHEMISTRY: ICD-10-CM

## 2024-07-02 LAB — VIT B12 SERPL-MCNC: 230 PG/ML

## 2024-11-06 ENCOUNTER — APPOINTMENT (OUTPATIENT)
Dept: INTERNAL MEDICINE | Facility: CLINIC | Age: 79
End: 2024-11-06
Payer: MEDICARE

## 2024-11-06 VITALS
TEMPERATURE: 97.3 F | HEIGHT: 67 IN | DIASTOLIC BLOOD PRESSURE: 74 MMHG | OXYGEN SATURATION: 96 % | BODY MASS INDEX: 30.13 KG/M2 | RESPIRATION RATE: 14 BRPM | HEART RATE: 62 BPM | SYSTOLIC BLOOD PRESSURE: 129 MMHG | WEIGHT: 192 LBS

## 2024-11-06 DIAGNOSIS — I77.9 DISORDER OF ARTERIES AND ARTERIOLES, UNSPECIFIED: ICD-10-CM

## 2024-11-06 PROCEDURE — 99214 OFFICE O/P EST MOD 30 MIN: CPT

## 2024-11-07 LAB
ALBUMIN SERPL ELPH-MCNC: 4.7 G/DL
ALP BLD-CCNC: 97 U/L
ALT SERPL-CCNC: 11 U/L
ANION GAP SERPL CALC-SCNC: 11 MMOL/L
APPEARANCE: CLEAR
AST SERPL-CCNC: 15 U/L
BILIRUB SERPL-MCNC: 0.7 MG/DL
BILIRUBIN URINE: NEGATIVE
BLOOD URINE: NEGATIVE
BUN SERPL-MCNC: 22 MG/DL
CALCIUM SERPL-MCNC: 9.6 MG/DL
CHLORIDE SERPL-SCNC: 102 MMOL/L
CHOLEST SERPL-MCNC: 128 MG/DL
CO2 SERPL-SCNC: 28 MMOL/L
COLOR: YELLOW
CREAT SERPL-MCNC: 1.01 MG/DL
EGFR: 75 ML/MIN/1.73M2
GLUCOSE QUALITATIVE U: NEGATIVE MG/DL
GLUCOSE SERPL-MCNC: 77 MG/DL
HDLC SERPL-MCNC: 41 MG/DL
KETONES URINE: NEGATIVE MG/DL
LDLC SERPL CALC-MCNC: 73 MG/DL
LEUKOCYTE ESTERASE URINE: NEGATIVE
NITRITE URINE: NEGATIVE
NONHDLC SERPL-MCNC: 87 MG/DL
PH URINE: 5.5
POTASSIUM SERPL-SCNC: 4.5 MMOL/L
PROT SERPL-MCNC: 6.9 G/DL
PROTEIN URINE: NEGATIVE MG/DL
SODIUM SERPL-SCNC: 142 MMOL/L
SPECIFIC GRAVITY URINE: 1.02
TRIGL SERPL-MCNC: 71 MG/DL
UROBILINOGEN URINE: 0.2 MG/DL

## 2024-11-11 LAB
APPEARANCE: CLEAR
BILIRUBIN URINE: NEGATIVE
BLOOD URINE: NEGATIVE
COLOR: YELLOW
GLUCOSE QUALITATIVE U: NEGATIVE MG/DL
KETONES URINE: NEGATIVE MG/DL
LEUKOCYTE ESTERASE URINE: NEGATIVE
NITRITE URINE: NEGATIVE
PH URINE: 5.5
PROTEIN URINE: NEGATIVE MG/DL
SPECIFIC GRAVITY URINE: 1.02
UROBILINOGEN URINE: 0.2 MG/DL

## 2025-02-14 NOTE — ASU PREOP CHECKLIST - VIA
Use Mucinex 1200 mg twice a day to help thin out secretions and help it get it out  Get Robitussin DM severe night max-20 ml every 4 hours at night to help with sleep   Rest, push fluids and if this was flu normally resolves after 1-1.5 weeks   It was influenza A-the flu; this is self limiting; able to return to work/events with mask (if coughing/sneezing/drainage still present)  if fever free without meds for full 24 hours   
ambulate

## 2025-03-26 ENCOUNTER — APPOINTMENT (OUTPATIENT)
Dept: INTERNAL MEDICINE | Facility: CLINIC | Age: 80
End: 2025-03-26

## 2025-03-26 VITALS
WEIGHT: 178 LBS | RESPIRATION RATE: 14 BRPM | DIASTOLIC BLOOD PRESSURE: 61 MMHG | HEIGHT: 67 IN | BODY MASS INDEX: 27.94 KG/M2 | TEMPERATURE: 97.3 F | OXYGEN SATURATION: 97 % | HEART RATE: 59 BPM | SYSTOLIC BLOOD PRESSURE: 99 MMHG

## 2025-03-26 DIAGNOSIS — E78.5 HYPERLIPIDEMIA, UNSPECIFIED: ICD-10-CM

## 2025-03-26 DIAGNOSIS — R68.89 OTHER GENERAL SYMPTOMS AND SIGNS: ICD-10-CM

## 2025-03-26 DIAGNOSIS — E53.8 DEFICIENCY OF OTHER SPECIFIED B GROUP VITAMINS: ICD-10-CM

## 2025-03-26 PROCEDURE — 99214 OFFICE O/P EST MOD 30 MIN: CPT

## 2025-03-27 LAB
ALBUMIN SERPL ELPH-MCNC: 4.1 G/DL
ALP BLD-CCNC: 101 U/L
ALT SERPL-CCNC: 13 U/L
ANION GAP SERPL CALC-SCNC: 11 MMOL/L
AST SERPL-CCNC: 23 U/L
BASOPHILS # BLD AUTO: 0.04 K/UL
BASOPHILS NFR BLD AUTO: 0.7 %
BILIRUB SERPL-MCNC: 0.5 MG/DL
BUN SERPL-MCNC: 14 MG/DL
CALCIUM SERPL-MCNC: 9.7 MG/DL
CHLORIDE SERPL-SCNC: 104 MMOL/L
CO2 SERPL-SCNC: 27 MMOL/L
CREAT SERPL-MCNC: 0.98 MG/DL
EGFRCR SERPLBLD CKD-EPI 2021: 77 ML/MIN/1.73M2
EOSINOPHIL # BLD AUTO: 0.07 K/UL
EOSINOPHIL NFR BLD AUTO: 1.3 %
GLUCOSE SERPL-MCNC: 104 MG/DL
HCT VFR BLD CALC: 44.1 %
HGB BLD-MCNC: 13.7 G/DL
IMM GRANULOCYTES NFR BLD AUTO: 0.4 %
LYMPHOCYTES # BLD AUTO: 0.95 K/UL
LYMPHOCYTES NFR BLD AUTO: 17.2 %
MAN DIFF?: NORMAL
MCHC RBC-ENTMCNC: 29.3 PG
MCHC RBC-ENTMCNC: 31.1 G/DL
MCV RBC AUTO: 94.4 FL
MONOCYTES # BLD AUTO: 0.45 K/UL
MONOCYTES NFR BLD AUTO: 8.2 %
NEUTROPHILS # BLD AUTO: 3.98 K/UL
NEUTROPHILS NFR BLD AUTO: 72.2 %
PCA AB SER QL IF: NORMAL
PLATELET # BLD AUTO: 254 K/UL
POTASSIUM SERPL-SCNC: 5.2 MMOL/L
PROT SERPL-MCNC: 7.1 G/DL
RBC # BLD: 4.67 M/UL
RBC # FLD: 14.6 %
SODIUM SERPL-SCNC: 142 MMOL/L
TSH SERPL-ACNC: 1.47 UIU/ML
VIT B12 SERPL-MCNC: 212 PG/ML
WBC # FLD AUTO: 5.51 K/UL

## 2025-03-29 LAB — IF BLOCK AB SER QL: 1 AU/ML

## 2025-03-31 LAB
CHOLEST SERPL-MCNC: 137 MG/DL
HDLC SERPL-MCNC: 35 MG/DL
LDLC SERPL-MCNC: 84 MG/DL
METHYLMALONATE SERPL-SCNC: 277 NMOL/L
NONHDLC SERPL-MCNC: 102 MG/DL
TRIGL SERPL-MCNC: 100 MG/DL

## 2025-05-07 ENCOUNTER — APPOINTMENT (OUTPATIENT)
Dept: INTERNAL MEDICINE | Facility: CLINIC | Age: 80
End: 2025-05-07
Payer: MEDICARE

## 2025-05-07 PROCEDURE — 96372 THER/PROPH/DIAG INJ SC/IM: CPT

## 2025-05-07 RX ORDER — CYANOCOBALAMIN 1000 UG/ML
1000 INJECTION, SOLUTION INTRAMUSCULAR; SUBCUTANEOUS
Qty: 0 | Refills: 0 | Status: COMPLETED | OUTPATIENT
Start: 2025-05-07

## 2025-05-07 RX ADMIN — CYANOCOBALAMIN 0 MCG/ML: 1000 INJECTION, SOLUTION INTRAMUSCULAR at 00:00

## 2025-05-12 DIAGNOSIS — F41.9 ANXIETY DISORDER, UNSPECIFIED: ICD-10-CM

## 2025-05-12 DIAGNOSIS — F32.A ANXIETY DISORDER, UNSPECIFIED: ICD-10-CM

## 2025-05-13 ENCOUNTER — APPOINTMENT (OUTPATIENT)
Dept: INTERNAL MEDICINE | Facility: CLINIC | Age: 80
End: 2025-05-13
Payer: MEDICARE

## 2025-05-13 VITALS
SYSTOLIC BLOOD PRESSURE: 114 MMHG | RESPIRATION RATE: 18 BRPM | BODY MASS INDEX: 28.25 KG/M2 | HEART RATE: 57 BPM | DIASTOLIC BLOOD PRESSURE: 69 MMHG | WEIGHT: 180 LBS | HEIGHT: 67 IN | OXYGEN SATURATION: 96 %

## 2025-05-13 DIAGNOSIS — R68.89 OTHER GENERAL SYMPTOMS AND SIGNS: ICD-10-CM

## 2025-05-13 DIAGNOSIS — E78.5 HYPERLIPIDEMIA, UNSPECIFIED: ICD-10-CM

## 2025-05-13 DIAGNOSIS — E53.8 DEFICIENCY OF OTHER SPECIFIED B GROUP VITAMINS: ICD-10-CM

## 2025-05-13 PROCEDURE — 99214 OFFICE O/P EST MOD 30 MIN: CPT

## 2025-06-06 ENCOUNTER — APPOINTMENT (OUTPATIENT)
Dept: INTERNAL MEDICINE | Facility: CLINIC | Age: 80
End: 2025-06-06

## 2025-06-17 ENCOUNTER — APPOINTMENT (OUTPATIENT)
Dept: INTERNAL MEDICINE | Facility: CLINIC | Age: 80
End: 2025-06-17

## 2025-06-17 VITALS
OXYGEN SATURATION: 97 % | RESPIRATION RATE: 19 BRPM | HEART RATE: 62 BPM | BODY MASS INDEX: 29.19 KG/M2 | HEIGHT: 67 IN | WEIGHT: 186 LBS

## 2025-06-17 VITALS — DIASTOLIC BLOOD PRESSURE: 70 MMHG | SYSTOLIC BLOOD PRESSURE: 112 MMHG

## 2025-06-17 PROCEDURE — 99214 OFFICE O/P EST MOD 30 MIN: CPT

## 2025-09-10 ENCOUNTER — NON-APPOINTMENT (OUTPATIENT)
Age: 80
End: 2025-09-10

## 2025-09-11 ENCOUNTER — APPOINTMENT (OUTPATIENT)
Dept: INTERNAL MEDICINE | Facility: CLINIC | Age: 80
End: 2025-09-11

## 2025-09-11 VITALS
RESPIRATION RATE: 19 BRPM | BODY MASS INDEX: 29.03 KG/M2 | HEIGHT: 67 IN | HEART RATE: 77 BPM | TEMPERATURE: 98.1 F | OXYGEN SATURATION: 96 % | WEIGHT: 185 LBS

## 2025-09-11 VITALS — SYSTOLIC BLOOD PRESSURE: 118 MMHG | DIASTOLIC BLOOD PRESSURE: 70 MMHG

## 2025-09-11 DIAGNOSIS — Z00.00 ENCOUNTER FOR GENERAL ADULT MEDICAL EXAMINATION W/OUT ABNORMAL FINDINGS: ICD-10-CM

## 2025-09-11 LAB
25(OH)D3 SERPL-MCNC: 39 NG/ML
ALBUMIN SERPL ELPH-MCNC: 3.9 G/DL
ALP BLD-CCNC: 117 U/L
ALT SERPL-CCNC: 18 U/L
ANION GAP SERPL CALC-SCNC: 13 MMOL/L
AST SERPL-CCNC: 19 U/L
BASOPHILS # BLD AUTO: 0.02 K/UL
BASOPHILS NFR BLD AUTO: 0.3 %
BILIRUB SERPL-MCNC: 0.4 MG/DL
BUN SERPL-MCNC: 13 MG/DL
CALCIUM SERPL-MCNC: 9.4 MG/DL
CHLORIDE SERPL-SCNC: 104 MMOL/L
CHOLEST SERPL-MCNC: 96 MG/DL
CO2 SERPL-SCNC: 26 MMOL/L
CREAT SERPL-MCNC: 0.89 MG/DL
EGFRCR SERPLBLD CKD-EPI 2021: 86 ML/MIN/1.73M2
EOSINOPHIL # BLD AUTO: 0.06 K/UL
EOSINOPHIL NFR BLD AUTO: 1 %
ESTIMATED AVERAGE GLUCOSE: 120 MG/DL
GLUCOSE SERPL-MCNC: 94 MG/DL
HBA1C MFR BLD HPLC: 5.8 %
HCT VFR BLD CALC: 36.9 %
HDLC SERPL-MCNC: 39 MG/DL
HGB BLD-MCNC: 11.3 G/DL
IMM GRANULOCYTES NFR BLD AUTO: 0.3 %
LDLC SERPL-MCNC: 41 MG/DL
LYMPHOCYTES # BLD AUTO: 0.75 K/UL
LYMPHOCYTES NFR BLD AUTO: 12.6 %
MAN DIFF?: NORMAL
MCHC RBC-ENTMCNC: 28.2 PG
MCHC RBC-ENTMCNC: 30.6 G/DL
MCV RBC AUTO: 92 FL
MONOCYTES # BLD AUTO: 0.56 K/UL
MONOCYTES NFR BLD AUTO: 9.4 %
NEUTROPHILS # BLD AUTO: 4.54 K/UL
NEUTROPHILS NFR BLD AUTO: 76.4 %
NONHDLC SERPL-MCNC: 57 MG/DL
PLATELET # BLD AUTO: 233 K/UL
POTASSIUM SERPL-SCNC: 5 MMOL/L
PROT SERPL-MCNC: 6.8 G/DL
PSA SERPL-MCNC: 2.24 NG/ML
RBC # BLD: 4.01 M/UL
RBC # FLD: 15.4 %
SODIUM SERPL-SCNC: 143 MMOL/L
TRIGL SERPL-MCNC: 82 MG/DL
TSH SERPL-ACNC: 1.93 UIU/ML
VIT B12 SERPL-MCNC: 686 PG/ML
WBC # FLD AUTO: 5.95 K/UL

## 2025-09-11 PROCEDURE — G0439: CPT

## 2025-09-11 RX ORDER — DONEPEZIL HYDROCHLORIDE 10 MG/1
10 TABLET ORAL
Qty: 90 | Refills: 1 | Status: ACTIVE | COMMUNITY
Start: 2025-09-11 | End: 1900-01-01

## 2025-09-11 RX ORDER — RISPERIDONE 1 MG/1
1 TABLET, FILM COATED ORAL
Qty: 90 | Refills: 1 | Status: ACTIVE | COMMUNITY
Start: 2025-09-11 | End: 1900-01-01

## 2025-09-12 LAB
APPEARANCE: CLEAR
BILIRUBIN URINE: NEGATIVE
BLOOD URINE: NEGATIVE
COLOR: YELLOW
GLUCOSE QUALITATIVE U: NEGATIVE MG/DL
KETONES URINE: NEGATIVE MG/DL
LEUKOCYTE ESTERASE URINE: NEGATIVE
NITRITE URINE: NEGATIVE
PH URINE: 6
PROTEIN URINE: NEGATIVE MG/DL
SPECIFIC GRAVITY URINE: 1.01
UROBILINOGEN URINE: 0.2 MG/DL

## (undated) DEVICE — SUT MONOCRYL 4-0 27" PS-2 UNDYED

## (undated) DEVICE — VENODYNE/SCD SLEEVE CALF MEDIUM

## (undated) DEVICE — URETERAL CATH RED RUBBER 16FR (ORANGE)

## (undated) DEVICE — SUT VICRYL 3-0 27" SH UNDYED

## (undated) DEVICE — Device

## (undated) DEVICE — SUT PROLENE 6-0 18" C-1

## (undated) DEVICE — POSITIONER STRAP ARMBOARD VELCRO TS-30

## (undated) DEVICE — SUT PROLENE 6-0 30" BV-1

## (undated) DEVICE — WARMING BLANKET FULL ADULT

## (undated) DEVICE — FOLEY TRAY 16FR 5CC LF UMETER CLOSED

## (undated) DEVICE — DRAPE 3/4 SHEET 52X76"

## (undated) DEVICE — DRAPE TOWEL BLUE 17" X 24"

## (undated) DEVICE — DRSG TAPE UMBILICAL COTTON 2" X 30 X 1/8"

## (undated) DEVICE — POSITIONER FOAM EGG CRATE ULNAR 2PCS (PINK)

## (undated) DEVICE — SYR LUER LOK 3CC

## (undated) DEVICE — PROTECTOR HEEL / ELBOW FLUFFY

## (undated) DEVICE — DRSG CURITY GAUZE SPONGE 4 X 4" 12-PLY

## (undated) DEVICE — DRAPE MINOR PROCEDURE

## (undated) DEVICE — SOL IRR POUR NS 0.9% 500ML

## (undated) DEVICE — SPONGE PEANUT AUTO COUNT

## (undated) DEVICE — GLV 7.5 PROTEXIS (CREAM) MICRO

## (undated) DEVICE — ELCTR GROUNDING PAD ADULT COVIDIEN

## (undated) DEVICE — SUT VICRYL 3-0 18" SH UNDYED (POP-OFF)

## (undated) DEVICE — SUT PROLENE 6-0 30" C-1

## (undated) DEVICE — VISITEC 4X4

## (undated) DEVICE — PACK PERIPHERAL VASC

## (undated) DEVICE — NDL HYPO SAFE 25G X 5/8" (ORANGE)

## (undated) DEVICE — STAPLER SKIN MULTI DIRECTION W35

## (undated) DEVICE — SUCTION YANKAUER NO CONTROL VENT

## (undated) DEVICE — TAPE SILK 3"